# Patient Record
Sex: MALE | Race: OTHER | Employment: FULL TIME | URBAN - METROPOLITAN AREA
[De-identification: names, ages, dates, MRNs, and addresses within clinical notes are randomized per-mention and may not be internally consistent; named-entity substitution may affect disease eponyms.]

---

## 2017-04-07 ENCOUNTER — HOSPITAL ENCOUNTER (OUTPATIENT)
Dept: LAB | Age: 42
Discharge: HOME | End: 2017-04-07
Payer: COMMERCIAL

## 2017-04-07 ENCOUNTER — OFFICE VISIT (OUTPATIENT)
Dept: FAMILY MEDICINE CLINIC | Age: 42
End: 2017-04-07

## 2017-04-07 VITALS
DIASTOLIC BLOOD PRESSURE: 113 MMHG | HEIGHT: 63 IN | SYSTOLIC BLOOD PRESSURE: 167 MMHG | WEIGHT: 147 LBS | HEART RATE: 98 BPM | RESPIRATION RATE: 20 BRPM | BODY MASS INDEX: 26.05 KG/M2 | TEMPERATURE: 97.7 F | OXYGEN SATURATION: 100 %

## 2017-04-07 DIAGNOSIS — I10 ESSENTIAL HYPERTENSION: Primary | ICD-10-CM

## 2017-04-07 DIAGNOSIS — E78.5 OTHER AND UNSPECIFIED HYPERLIPIDEMIA: ICD-10-CM

## 2017-04-07 DIAGNOSIS — J45.20 MILD INTERMITTENT ASTHMA WITHOUT COMPLICATION: ICD-10-CM

## 2017-04-07 DIAGNOSIS — I10 ESSENTIAL HYPERTENSION: ICD-10-CM

## 2017-04-07 LAB
ALBUMIN SERPL BCP-MCNC: 4 G/DL (ref 3.4–5)
ALBUMIN/GLOB SERPL: 1.1 {RATIO} (ref 0.8–1.7)
ALP SERPL-CCNC: 58 U/L (ref 45–117)
ALT SERPL-CCNC: 40 U/L (ref 16–61)
ANION GAP BLD CALC-SCNC: 6 MMOL/L (ref 3–18)
AST SERPL W P-5'-P-CCNC: 30 U/L (ref 15–37)
BASOPHILS # BLD AUTO: 0.1 K/UL (ref 0–0.06)
BASOPHILS # BLD: 1 % (ref 0–2)
BILIRUB SERPL-MCNC: 0.5 MG/DL (ref 0.2–1)
BUN SERPL-MCNC: 10 MG/DL (ref 7–18)
BUN/CREAT SERPL: 12 (ref 12–20)
CALCIUM SERPL-MCNC: 9.2 MG/DL (ref 8.5–10.1)
CHLORIDE SERPL-SCNC: 103 MMOL/L (ref 100–108)
CHOLEST SERPL-MCNC: 222 MG/DL
CO2 SERPL-SCNC: 29 MMOL/L (ref 21–32)
CREAT SERPL-MCNC: 0.82 MG/DL (ref 0.6–1.3)
DIFFERENTIAL METHOD BLD: ABNORMAL
EOSINOPHIL # BLD: 0.2 K/UL (ref 0–0.4)
EOSINOPHIL NFR BLD: 3 % (ref 0–5)
ERYTHROCYTE [DISTWIDTH] IN BLOOD BY AUTOMATED COUNT: 12.2 % (ref 11.6–14.5)
GLOBULIN SER CALC-MCNC: 3.8 G/DL (ref 2–4)
GLUCOSE SERPL-MCNC: 99 MG/DL (ref 74–99)
HCT VFR BLD AUTO: 44 % (ref 36–48)
HDLC SERPL-MCNC: 72 MG/DL (ref 40–60)
HDLC SERPL: 3.1 {RATIO} (ref 0–5)
HGB BLD-MCNC: 14.5 G/DL (ref 13–16)
LDLC SERPL CALC-MCNC: 121.2 MG/DL (ref 0–100)
LIPID PROFILE,FLP: ABNORMAL
LYMPHOCYTES # BLD AUTO: 22 % (ref 21–52)
LYMPHOCYTES # BLD: 1.3 K/UL (ref 0.9–3.6)
MCH RBC QN AUTO: 30.3 PG (ref 24–34)
MCHC RBC AUTO-ENTMCNC: 33 G/DL (ref 31–37)
MCV RBC AUTO: 92.1 FL (ref 74–97)
MONOCYTES # BLD: 0.6 K/UL (ref 0.05–1.2)
MONOCYTES NFR BLD AUTO: 10 % (ref 3–10)
NEUTS SEG # BLD: 4 K/UL (ref 1.8–8)
NEUTS SEG NFR BLD AUTO: 64 % (ref 40–73)
PLATELET # BLD AUTO: 196 K/UL (ref 135–420)
PMV BLD AUTO: 11.3 FL (ref 9.2–11.8)
POTASSIUM SERPL-SCNC: 4.8 MMOL/L (ref 3.5–5.5)
PROT SERPL-MCNC: 7.8 G/DL (ref 6.4–8.2)
RBC # BLD AUTO: 4.78 M/UL (ref 4.7–5.5)
SODIUM SERPL-SCNC: 138 MMOL/L (ref 136–145)
TRIGL SERPL-MCNC: 144 MG/DL (ref ?–150)
VLDLC SERPL CALC-MCNC: 28.8 MG/DL
WBC # BLD AUTO: 6.1 K/UL (ref 4.6–13.2)

## 2017-04-07 PROCEDURE — 80061 LIPID PANEL: CPT | Performed by: INTERNAL MEDICINE

## 2017-04-07 PROCEDURE — 85025 COMPLETE CBC W/AUTO DIFF WBC: CPT | Performed by: INTERNAL MEDICINE

## 2017-04-07 PROCEDURE — 36415 COLL VENOUS BLD VENIPUNCTURE: CPT | Performed by: INTERNAL MEDICINE

## 2017-04-07 PROCEDURE — 80053 COMPREHEN METABOLIC PANEL: CPT | Performed by: INTERNAL MEDICINE

## 2017-04-07 RX ORDER — AMLODIPINE BESYLATE 5 MG/1
5 TABLET ORAL DAILY
Qty: 30 TAB | Refills: 1 | Status: SHIPPED | OUTPATIENT
Start: 2017-04-07 | End: 2017-04-28 | Stop reason: SDUPTHER

## 2017-04-07 RX ORDER — VALSARTAN 160 MG/1
160 TABLET ORAL DAILY
Qty: 30 TAB | Refills: 1 | Status: SHIPPED | OUTPATIENT
Start: 2017-04-07 | End: 2017-04-28 | Stop reason: SDUPTHER

## 2017-04-07 RX ORDER — ALBUTEROL SULFATE 90 UG/1
2 AEROSOL, METERED RESPIRATORY (INHALATION)
Qty: 1 INHALER | Refills: 3 | Status: SHIPPED | OUTPATIENT
Start: 2017-04-07 | End: 2018-05-10 | Stop reason: SDUPTHER

## 2017-04-07 NOTE — PROGRESS NOTES
HISTORY OF PRESENT ILLNESS  Betsy Garcia is a 39 y.o. male. HPI  HTN, not controlled, he is out of meds, last OV here was 2014!! HLD, not controlled, will repeat labs    Asthma, mild, intermittent, stable, use albuterol prn only  Review of Systems   Respiratory: Negative for cough and shortness of breath. Cardiovascular: Negative for chest pain and palpitations. Gastrointestinal: Negative for abdominal pain and nausea. Neurological: Negative for headaches. Physical Exam   Constitutional: He is oriented to person, place, and time. He appears well-developed and well-nourished. Cardiovascular: Normal rate, regular rhythm and normal heart sounds. Pulmonary/Chest: Effort normal and breath sounds normal. He has no wheezes. He has no rales. Abdominal: Soft. There is no tenderness. Musculoskeletal: He exhibits no edema. Neurological: He is alert and oriented to person, place, and time. Vitals reviewed. ASSESSMENT and PLAN  Jamey John was seen today for hypertension and medication refill. Diagnoses and all orders for this visit:    Essential hypertension, not controlled  -     amLODIPine (NORVASC) 5 mg tablet; Take 1 Tab by mouth daily. -     valsartan (DIOVAN) 160 mg tablet; Take 1 Tab by mouth daily.  -     CBC WITH AUTOMATED DIFF; Future  -     LIPID PANEL; Future  -     METABOLIC PANEL, COMPREHENSIVE; Future    Other and unspecified hyperlipidemia, wait for labs  -     CBC WITH AUTOMATED DIFF; Future  -     LIPID PANEL; Future  -     METABOLIC PANEL, COMPREHENSIVE; Future    Mild intermittent asthma without complication, stable  -     albuterol (PROVENTIL HFA, VENTOLIN HFA, PROAIR HFA) 90 mcg/actuation inhaler; Take 2 Puffs by inhalation every six (6) hours as needed for Wheezing.     Lab Results   Component Value Date/Time    Cholesterol, total 206 10/31/2014 09:39 AM    HDL Cholesterol 56 10/31/2014 09:39 AM    LDL, calculated 125 10/31/2014 09:39 AM    VLDL, calculated 25 10/31/2014 09:39 AM    Triglyceride 126 10/31/2014 09:39 AM    CHOL/HDL Ratio 2.1 03/23/2010 09:37 AM     rtc 3 weeks

## 2017-04-28 ENCOUNTER — OFFICE VISIT (OUTPATIENT)
Dept: FAMILY MEDICINE CLINIC | Age: 42
End: 2017-04-28

## 2017-04-28 VITALS
TEMPERATURE: 99.1 F | BODY MASS INDEX: 26.05 KG/M2 | SYSTOLIC BLOOD PRESSURE: 148 MMHG | DIASTOLIC BLOOD PRESSURE: 100 MMHG | WEIGHT: 147 LBS | HEIGHT: 63 IN | HEART RATE: 74 BPM | OXYGEN SATURATION: 98 %

## 2017-04-28 DIAGNOSIS — I10 ESSENTIAL HYPERTENSION: Primary | ICD-10-CM

## 2017-04-28 DIAGNOSIS — E78.5 OTHER AND UNSPECIFIED HYPERLIPIDEMIA: ICD-10-CM

## 2017-04-28 RX ORDER — VALSARTAN 320 MG/1
320 TABLET ORAL DAILY
Qty: 30 TAB | Refills: 2 | Status: SHIPPED | OUTPATIENT
Start: 2017-04-28 | End: 2017-08-01 | Stop reason: SDUPTHER

## 2017-04-28 RX ORDER — AMLODIPINE BESYLATE 10 MG/1
10 TABLET ORAL DAILY
Qty: 30 TAB | Refills: 2 | Status: SHIPPED | OUTPATIENT
Start: 2017-04-28 | End: 2017-08-01

## 2017-04-28 RX ORDER — ATORVASTATIN CALCIUM 10 MG/1
10 TABLET, FILM COATED ORAL DAILY
Qty: 30 TAB | Refills: 3 | Status: SHIPPED | OUTPATIENT
Start: 2017-04-28 | End: 2021-01-13 | Stop reason: SDUPTHER

## 2017-04-28 NOTE — PROGRESS NOTES
HISTORY OF PRESENT ILLNESS  Danish White is a 39 y.o. male. HPI  HTN, not controlled, bp is still elevated , he is taking his meds daily    HLD, not controlled, recent labs noted, LDL was 121, discussed treatment today  Review of Systems   Respiratory: Negative for cough and shortness of breath. Cardiovascular: Negative for chest pain, palpitations and leg swelling. Gastrointestinal: Negative for abdominal pain and nausea. Musculoskeletal: Negative for myalgias. Neurological: Negative for dizziness and headaches. Physical Exam   Constitutional: He is oriented to person, place, and time. He appears well-developed and well-nourished. Neck: Neck supple. No thyromegaly present. Cardiovascular: Normal rate, regular rhythm, normal heart sounds and intact distal pulses. No murmur heard. Pulmonary/Chest: Effort normal and breath sounds normal. No respiratory distress. He has no rales. Abdominal: Soft. Bowel sounds are normal. There is no hepatosplenomegaly. There is no tenderness. Musculoskeletal: He exhibits no edema. Neurological: He is alert and oriented to person, place, and time. Vitals reviewed. ASSESSMENT and PLAN  Shekhar Garber was seen today for hypertension. Diagnoses and all orders for this visit:    Essential hypertension, uncontrolled, increase meds doses  -     valsartan (DIOVAN) 320 mg tablet; Take 1 Tab by mouth daily. -     amLODIPine (NORVASC) 10 mg tablet; Take 1 Tab by mouth daily. Other and unspecified hyperlipidemia, not controlled, start:  -     atorvastatin (LIPITOR) 10 mg tablet; Take 1 Tab by mouth daily.     Lab Results   Component Value Date/Time    Cholesterol, total 222 04/07/2017 10:01 AM    HDL Cholesterol 72 04/07/2017 10:01 AM    LDL, calculated 121.2 04/07/2017 10:01 AM    VLDL, calculated 28.8 04/07/2017 10:01 AM    Triglyceride 144 04/07/2017 10:01 AM    CHOL/HDL Ratio 3.1 04/07/2017 10:01 AM     Lab Results   Component Value Date/Time    Sodium 138 04/07/2017 10:01 AM    Potassium 4.8 04/07/2017 10:01 AM    Chloride 103 04/07/2017 10:01 AM    CO2 29 04/07/2017 10:01 AM    Anion gap 6 04/07/2017 10:01 AM    Glucose 99 04/07/2017 10:01 AM    BUN 10 04/07/2017 10:01 AM    Creatinine 0.82 04/07/2017 10:01 AM    BUN/Creatinine ratio 12 04/07/2017 10:01 AM    GFR est AA >60 04/07/2017 10:01 AM    GFR est non-AA >60 04/07/2017 10:01 AM    Calcium 9.2 04/07/2017 10:01 AM    Bilirubin, total 0.5 04/07/2017 10:01 AM    AST (SGOT) 30 04/07/2017 10:01 AM    Alk.  phosphatase 58 04/07/2017 10:01 AM    Protein, total 7.8 04/07/2017 10:01 AM    Albumin 4.0 04/07/2017 10:01 AM    Globulin 3.8 04/07/2017 10:01 AM    A-G Ratio 1.1 04/07/2017 10:01 AM    ALT (SGPT) 40 04/07/2017 10:01 AM       rtc 2 mos

## 2017-04-28 NOTE — PROGRESS NOTES
1. Have you been to the ER, urgent care clinic since your last visit? Hospitalized since your last visit? No    2. Have you seen or consulted any other health care providers outside of the [address] [place] [address] since your last visit? Include any pap smears or colon screening.  No

## 2017-07-07 ENCOUNTER — TELEPHONE (OUTPATIENT)
Dept: FAMILY MEDICINE CLINIC | Age: 42
End: 2017-07-07

## 2017-07-07 NOTE — TELEPHONE ENCOUNTER
Pt's wife called because hr  is wanting to stop drinking but he will be going through withdrawal when he does that. They are looking for guidance on how to go about that please advise.

## 2017-07-10 ENCOUNTER — OFFICE VISIT (OUTPATIENT)
Dept: FAMILY MEDICINE CLINIC | Age: 42
End: 2017-07-10

## 2017-07-10 VITALS
BODY MASS INDEX: 25.34 KG/M2 | SYSTOLIC BLOOD PRESSURE: 162 MMHG | RESPIRATION RATE: 20 BRPM | TEMPERATURE: 98.7 F | HEART RATE: 96 BPM | OXYGEN SATURATION: 98 % | WEIGHT: 143 LBS | HEIGHT: 63 IN | DIASTOLIC BLOOD PRESSURE: 96 MMHG

## 2017-07-10 DIAGNOSIS — I10 ESSENTIAL HYPERTENSION: Primary | ICD-10-CM

## 2017-07-10 DIAGNOSIS — F10.10 ETOH ABUSE: ICD-10-CM

## 2017-07-10 RX ORDER — LORAZEPAM 1 MG/1
1 TABLET ORAL
Qty: 60 TAB | Refills: 1 | Status: SHIPPED | OUTPATIENT
Start: 2017-07-10 | End: 2018-11-07

## 2017-07-10 RX ORDER — METOPROLOL SUCCINATE 25 MG/1
25 TABLET, EXTENDED RELEASE ORAL DAILY
Qty: 30 TAB | Refills: 2 | Status: SHIPPED | OUTPATIENT
Start: 2017-07-10 | End: 2017-08-01 | Stop reason: SDUPTHER

## 2017-07-10 NOTE — PROGRESS NOTES
HISTORY OF PRESENT ILLNESS  Kian Ramon is a 43 y.o. male. HPI  HTN, not controlled, his bp is elevated, he stopped norvasc due to legs edema  ETOH abuse, he stated that he has been drinking about 6-7 beers daily, he is trying to slow down but having tremors, wants to stop drinking completely, discussed with him and his wife today, will start ativan and ref to psych, he did abuse alcohol in the past then quit, but recently relapsed  Review of Systems   Constitutional: Negative for fever. Respiratory: Negative for cough and shortness of breath. Cardiovascular: Negative for chest pain and palpitations. Gastrointestinal: Negative for abdominal pain and nausea. Musculoskeletal: Negative for myalgias. Neurological: Negative for dizziness and headaches. Psychiatric/Behavioral: The patient is nervous/anxious. The patient does not have insomnia. Physical Exam   Constitutional: He is oriented to person, place, and time. He appears well-developed and well-nourished. Cardiovascular: Normal rate, regular rhythm, normal heart sounds and intact distal pulses. No murmur heard. Pulmonary/Chest: Effort normal and breath sounds normal. He has no rales. Abdominal: Soft. Bowel sounds are normal. There is no hepatosplenomegaly. There is no tenderness. Musculoskeletal: He exhibits no edema. Neurological: He is alert and oriented to person, place, and time. Psychiatric: His speech is normal and behavior is normal. Thought content normal. His mood appears anxious. Vitals reviewed. ASSESSMENT and PLAN  Heidi Garcia was seen today for alcohol problem and medication evaluation. Diagnoses and all orders for this visit:    Essential hypertension, uncontrolled  -     metoprolol succinate (TOPROL-XL) 25 mg XL tablet; Take 1 Tab by mouth daily. ETOH abuse, start:  -     LORazepam (ATIVAN) 1 mg tablet; Take 1 Tab by mouth every eight (8) hours as needed for Anxiety.  Max Daily Amount: 3 mg.  - REFERRAL TO PSYCHIATRY  Stop ETOH  Advised to go to the ER if tremors are not controlled by ativan    rtc 2 weeks

## 2017-07-10 NOTE — PROGRESS NOTES
1. Have you been to the ER, urgent care clinic since your last visit? Hospitalized since your last visit? No    2. Have you seen or consulted any other health care providers outside of the [address] [place] [address] since your last visit? Include any pap smears or colon screening.  No

## 2017-08-01 ENCOUNTER — OFFICE VISIT (OUTPATIENT)
Dept: FAMILY MEDICINE CLINIC | Age: 42
End: 2017-08-01

## 2017-08-01 VITALS
BODY MASS INDEX: 25.69 KG/M2 | SYSTOLIC BLOOD PRESSURE: 160 MMHG | HEIGHT: 63 IN | RESPIRATION RATE: 22 BRPM | OXYGEN SATURATION: 99 % | DIASTOLIC BLOOD PRESSURE: 100 MMHG | TEMPERATURE: 98.9 F | HEART RATE: 81 BPM | WEIGHT: 145 LBS

## 2017-08-01 DIAGNOSIS — F10.10 ETOH ABUSE: ICD-10-CM

## 2017-08-01 DIAGNOSIS — I10 ESSENTIAL HYPERTENSION: ICD-10-CM

## 2017-08-01 RX ORDER — VALSARTAN 320 MG/1
320 TABLET ORAL DAILY
Qty: 30 TAB | Refills: 2 | Status: SHIPPED | OUTPATIENT
Start: 2017-08-01 | End: 2017-12-15 | Stop reason: SDUPTHER

## 2017-08-01 RX ORDER — METOPROLOL SUCCINATE 50 MG/1
50 TABLET, EXTENDED RELEASE ORAL DAILY
Qty: 30 TAB | Refills: 1 | Status: SHIPPED | OUTPATIENT
Start: 2017-08-01 | End: 2017-10-01 | Stop reason: SDUPTHER

## 2017-08-01 RX ORDER — AMLODIPINE BESYLATE 2.5 MG/1
2.5 TABLET ORAL DAILY
Qty: 30 TAB | Refills: 1 | Status: SHIPPED | OUTPATIENT
Start: 2017-08-01 | End: 2017-09-05 | Stop reason: SDUPTHER

## 2017-08-01 NOTE — PROGRESS NOTES
HISTORY OF PRESENT ILLNESS  Rebecca Herrera is a 43 y.o. male. HPI  HTN, improving but not controlled yet, he is taking his meds daily  ETOH abuse, improving, he stated he is drinking 1-2 berrs per day now, using ativan only half tab in am, denies any tremors or insomnia    Review of Systems   HENT: Negative for hearing loss. Cardiovascular: Negative for chest pain and palpitations. Neurological: Negative for dizziness and headaches. Physical Exam   Constitutional: He is oriented to person, place, and time. He appears well-developed and well-nourished. Cardiovascular: Normal rate, regular rhythm and normal heart sounds. No murmur heard. Pulmonary/Chest: Effort normal and breath sounds normal. He has no rales. Abdominal: Soft. Bowel sounds are normal. There is no tenderness. Neurological: He is alert and oriented to person, place, and time. Psychiatric: He has a normal mood and affect. His behavior is normal. Judgment and thought content normal.   Vitals reviewed. ASSESSMENT and PLAN  Diagnoses and all orders for this visit:    1. Essential hypertension , not controlled  -     metoprolol succinate (TOPROL-XL) 50 mg XL tablet; Take 1 Tab by mouth daily. -     amLODIPine (NORVASC) 2.5 mg tablet; Take 1 Tab by mouth daily. -     valsartan (DIOVAN) 320 mg tablet; Take 1 Tab by mouth daily.     2- ETOH abuse, improving, advised to use ativan 2-3 x daily and stop ETOH completely    rtc 4 weeks, sooner if needed

## 2017-08-01 NOTE — MR AVS SNAPSHOT
Visit Information Date & Time Provider Department Dept. Phone Encounter #  
 8/1/2017  3:30 PM [facility] [phone] [id number] Upcoming Health Maintenance Date Due Pneumococcal 19-64 Medium Risk (1 of 1 - PPSV23) 7/7/1994 INFLUENZA AGE 9 TO ADULT 8/1/2017 DTaP/Tdap/Td series (2 - Td) 10/10/2022 Allergies as of 8/1/2017  Review Complete On: 7/10/2017 By: Barrington Pierce MD  
  
 Severity Noted Reaction Type Reactions Ace Inhibitors High 10/08/2010   Side Effect Cough Theophylline  03/22/2010    Unknown (comments) Current Immunizations  Never Reviewed Name Date TDAP Vaccine 10/10/2012 Not reviewed this visit You Were Diagnosed With   
  
 Codes Comments Essential hypertension     ICD-10-CM: I10 
ICD-9-CM: 401.9 Vitals BP Pulse Temp Resp Height(growth percentile) Weight(growth percentile) (!) 160/100 (BP 1 Location: Right arm, BP Patient Position: Sitting) 81 98.9 °F (37.2 °C) (Oral) 22 5' 3\" (1.6 m) 145 lb (65.8 kg) SpO2 BMI Smoking Status 99% 25.69 kg/m2 Former Smoker Vitals History BMI and BSA Data Body Mass Index Body Surface Area  
 25.69 kg/m 2 1.71 m 2 Preferred Pharmacy Pharmacy Name Phone [facility] [phone] Your Updated Medication List  
  
   
This list is accurate as of: 8/1/17  4:02 PM.  Always use your most recent med list.  
  
  
  
  
 albuterol 90 mcg/actuation inhaler Commonly known as:  PROVENTIL HFA, VENTOLIN HFA, PROAIR HFA Take 2 Puffs by inhalation every six (6) hours as needed for Wheezing. amLODIPine 2.5 mg tablet Commonly known as:  Cosme Matar Take 1 Tab by mouth daily. atorvastatin 10 mg tablet Commonly known as:  LIPITOR Take 1 Tab by mouth daily. LORazepam 1 mg tablet Commonly known as:  ATIVAN  
 Take 1 Tab by mouth every eight (8) hours as needed for Anxiety. Max Daily Amount: 3 mg.  
  
 metoprolol succinate 50 mg XL tablet Commonly known as:  TOPROL-XL Take 1 Tab by mouth daily. omeprazole 40 mg capsule Commonly known as:  PRILOSEC Take 1 Cap by mouth daily. valsartan 320 mg tablet Commonly known as:  DIOVAN Take 1 Tab by mouth daily. Prescriptions Sent to Pharmacy Refills  
 metoprolol succinate (TOPROL-XL) 50 mg XL tablet 1 Sig: Take 1 Tab by mouth daily. Class: Normal  
 Pharmacy: [address] [place] [address] #: [phone] Route: Oral  
 amLODIPine (NORVASC) 2.5 mg tablet 1 Sig: Take 1 Tab by mouth daily. Class: Normal  
 Pharmacy: [address] [place] [address] #: [phone] Route: Oral  
  
Introducing [facility] SERVICES! Ely Maravilla introduces Ping4 patient portal. Now you can access parts of your medical record, email your doctor's office, and request medication refills online. 1. In your internet browser, go to [email or web address] 2. Click on the First Time User? Click Here link in the Sign In box. You will see the New Member Sign Up page. 3. Enter your Ping4 Access Code exactly as it appears below. You will not need to use this code after youve completed the sign-up process. If you do not sign up before the expiration date, you must request a new code. · [employer] Access Code: [facility] Expires: 10/30/2017  4:02 PM 
 
4. Enter the last four digits of your Social Security Number (xxxx) and Date of Birth (mm/dd/yyyy) as indicated and click Submit. You will be taken to the next sign-up page. 5. Create a [employer] ID. This will be your Ping4 login ID and cannot be changed, so think of one that is secure and easy to remember. 6. Create a [employer] password. You can change your password at any time. 7. Enter your Password Reset Question and Answer. This can be used at a later time if you forget your password. 8. Enter your e-mail address. You will receive e-mail notification when new information is available in [address] 9. Click Sign Up. You can now view and download portions of your medical record. 10. Click the Download Summary menu link to download a portable copy of your medical information. If you have questions, please visit the Frequently Asked Questions section of the [employer] website. Remember, [employer] is NOT to be used for urgent needs. For medical emergencies, dial 911. Now available from your iPhone and Android! Please provide this summary of care documentation to your next provider. Your primary care clinician is listed as Alexandre Mcdowell. If you have any questions after today's visit, please call [phone].

## 2017-08-01 NOTE — PROGRESS NOTES
Rosanne Marte is a 43 y.o. male  1. Have you been to the ER, urgent care clinic since your last visit? Hospitalized since your last visit? No    2. Have you seen or consulted any other health care providers outside of the [facility] since your last visit? Include any pap smears or colon screening.  No

## 2017-09-05 ENCOUNTER — OFFICE VISIT (OUTPATIENT)
Dept: FAMILY MEDICINE CLINIC | Age: 42
End: 2017-09-05

## 2017-09-05 VITALS
HEIGHT: 63 IN | SYSTOLIC BLOOD PRESSURE: 162 MMHG | OXYGEN SATURATION: 99 % | BODY MASS INDEX: 25.16 KG/M2 | DIASTOLIC BLOOD PRESSURE: 100 MMHG | TEMPERATURE: 99.1 F | HEART RATE: 97 BPM | WEIGHT: 142 LBS | RESPIRATION RATE: 20 BRPM

## 2017-09-05 DIAGNOSIS — I10 ESSENTIAL HYPERTENSION: ICD-10-CM

## 2017-09-05 DIAGNOSIS — Z78.9 ALCOHOL USE: ICD-10-CM

## 2017-09-05 DIAGNOSIS — I10 ESSENTIAL HYPERTENSION: Primary | ICD-10-CM

## 2017-09-05 RX ORDER — AMLODIPINE BESYLATE 5 MG/1
5 TABLET ORAL DAILY
Qty: 30 TAB | Refills: 3 | Status: SHIPPED | OUTPATIENT
Start: 2017-09-05 | End: 2017-11-07 | Stop reason: SDUPTHER

## 2017-09-05 NOTE — PROGRESS NOTES
HISTORY OF PRESENT ILLNESS  Gaye Mattson is a 43 y.o. male. HPI  HTN, still not controlled, he stated he is taking diovan and norvasc daily, but did not take toprol xl for 4 days! ETOH abuse, he stated he still use 2 drinks per day at least, not able to stop   Review of Systems   Respiratory: Negative for shortness of breath. Cardiovascular: Negative for chest pain, palpitations and leg swelling. Gastrointestinal: Negative for abdominal pain and nausea. Physical Exam   Constitutional: He is oriented to person, place, and time. He appears well-developed and well-nourished. Cardiovascular: Normal rate, regular rhythm and normal heart sounds. No murmur heard. Pulmonary/Chest: Effort normal and breath sounds normal.   Abdominal: Soft. There is no tenderness. Musculoskeletal: He exhibits no edema. Neurological: He is alert and oriented to person, place, and time. Vitals reviewed. ASSESSMENT and PLAN  Diagnoses and all orders for this visit:    1. Essential hypertension, not controlled, advised not to stop any meds, he will go to the pharmacy and get his Toprol xl, increase norvasc to 5 mg, ref to Cardiology  -     DUPLEX RENAL ART/CHERELLE BILATERAL; Future  -     CBC WITH AUTOMATED DIFF; Future  -     METABOLIC PANEL, COMPREHENSIVE; Future  -     amLODIPine (NORVASC) 5 mg tablet; Take 1 Tab by mouth daily.  -     REFERRAL TO CARDIOLOGY  -     TSH 3RD GENERATION; Future  -     URINALYSIS W/ RFLX MICROSCOPIC; Future    2.  Alcohol use, not controlled, discussed the need to completely stop ETOH, will:  -     REFERRAL TO PSYCHIATRY

## 2017-09-05 NOTE — PROGRESS NOTES
Ying Meehan is a 43 y.o. male  1. Have you been to the ER, urgent care clinic since your last visit? Hospitalized since your last visit? No    2. Have you seen or consulted any other health care providers outside of the [address] [place] [address] since your last visit? Include any pap smears or colon screening.  No

## 2017-09-08 ENCOUNTER — TELEPHONE (OUTPATIENT)
Dept: CARDIOLOGY CLINIC | Age: 42
End: 2017-09-08

## 2017-09-08 NOTE — TELEPHONE ENCOUNTER
Left message w/ pt's person to sched NP appt per PCP referral.  Pt works at [employer] and will call us to sched.

## 2017-09-19 ENCOUNTER — HOSPITAL ENCOUNTER (OUTPATIENT)
Dept: VASCULAR SURGERY | Age: 42
Discharge: HOME | End: 2017-09-19
Attending: INTERNAL MEDICINE
Payer: COMMERCIAL

## 2017-09-19 DIAGNOSIS — I10 ESSENTIAL HYPERTENSION: ICD-10-CM

## 2017-09-19 PROCEDURE — 93975 VASCULAR STUDY: CPT

## 2017-09-19 NOTE — PROCEDURES
[facility]  *** FINAL REPORT ***    Name: Lazaro Hernandez  MRN: [id number]    Outpatient  : 1975  HIS Order #: [id number]  [id number] [facility] Visit #: [id number]  Date: 19 Sep 2017    TYPE OF TEST: Visceral Arterial Duplex    REASON FOR TEST  Benign HTN    Aortic PSV: 100.0 cm/s  Diameter AP:     cm   TV:     cm                   Right          Left  Renal Artery:- -------------  -------------  Proximal  PSV: 119.0          108.0  Mid       PSV: 110.0          116.0  Distal    PSV:  97.0           81.0  Aortic ratio :   1.2            1.2    Medullary PSV:  39.4           36.2            EDV:  16.3           14.5            EDR:   0.4            0.4            SDR:   2.4            2.5    Cortical  PSV:  24.3           34.2            EDV:  10.4           14.3            EDR:   0.4            0.4            SDR:   2.3            2.4  Stenosis:      Normal         Normal  Kidney size:   11.3 cm        11.3 cm               x  5.2 cm      x  5.1 cm    Hilar:-        Right          Left  Acc. Time  AT:     secs           secs  Acc. Index AI:             RI: 0.59           0.59    INTERPRETATION/FINDINGS  Duplex images were obtained using 2-D gray scale, color flow and  spectral doppler analysis. 1. Renal arteries are patent with color and spectral doppler  bilaterally without evidence of significant stenosis. 2. Patent renal artery origins without stenosis. 3. Systolic rise time and resistive indices are within normal limits  bilaterally. 4. Patent renal veins with normal hemodynamics. 5. Right kidney measures 11.3 x 5.2.   6. Left kidney measures 11.3 x 5.1. ADDITIONAL COMMENTS    I have personally reviewed the data relevant to the interpretation of  this  study. TECHNOLOGIST: Abhishek Glez. Reeta Kawasaki  Signed: 2017 10:41 AM    PHYSICIAN: Pia Emanuel.  Pepe Tomas MD  Signed: 2017 05:14 PM

## 2017-09-20 NOTE — PROGRESS NOTES
Normal renal arteries    Please remind pt to make appt with Cardiology, looks like they called him but no appt yet!!     Also please make sure he is seen by Psych

## 2017-09-20 NOTE — PROGRESS NOTES
Attempted to contact pt but no answer. Unable to leave message because no available VM/VM not set up yet.  Thank you  Roz Clarke LPN

## 2017-10-01 DIAGNOSIS — I10 ESSENTIAL HYPERTENSION: ICD-10-CM

## 2017-10-02 RX ORDER — METOPROLOL SUCCINATE 50 MG/1
TABLET, EXTENDED RELEASE ORAL
Qty: 30 TAB | Refills: 1 | Status: SHIPPED | OUTPATIENT
Start: 2017-10-02 | End: 2018-01-27 | Stop reason: SDUPTHER

## 2017-11-07 DIAGNOSIS — I10 ESSENTIAL HYPERTENSION: ICD-10-CM

## 2017-11-07 RX ORDER — AMLODIPINE BESYLATE 5 MG/1
5 TABLET ORAL DAILY
Qty: 90 TAB | Refills: 0 | Status: SHIPPED | OUTPATIENT
Start: 2017-11-07 | End: 2018-03-07 | Stop reason: SDUPTHER

## 2017-11-08 NOTE — TELEPHONE ENCOUNTER
Called and l/m to family member to have patient call office. If patient called back please inform/notify him of Dr. Samantha Masters.

## 2018-03-13 DIAGNOSIS — I10 ESSENTIAL HYPERTENSION: ICD-10-CM

## 2018-03-16 NOTE — TELEPHONE ENCOUNTER
Pt requesting RX refill(s) for:  Requested Prescriptions     Pending Prescriptions Disp Refills    valsartan (DIOVAN) 320 mg tablet [Pharmacy Med Name: VALSARTAN 320 MG TABLET] 30 Tab 0     Sig: TAKE 1 TABLET BY MOUTH DAILY.        Last refill: 12/16/17    Last visit: 09/05/17

## 2018-03-18 RX ORDER — VALSARTAN 320 MG/1
TABLET ORAL
Qty: 30 TAB | Refills: 0 | Status: SHIPPED | OUTPATIENT
Start: 2018-03-18 | End: 2018-04-19 | Stop reason: SDUPTHER

## 2018-04-05 DIAGNOSIS — I10 ESSENTIAL HYPERTENSION: ICD-10-CM

## 2018-04-05 RX ORDER — AMLODIPINE BESYLATE 5 MG/1
TABLET ORAL
Qty: 30 TAB | Refills: 0 | Status: SHIPPED | OUTPATIENT
Start: 2018-04-05 | End: 2018-05-11 | Stop reason: SDUPTHER

## 2018-04-19 DIAGNOSIS — I10 ESSENTIAL HYPERTENSION: ICD-10-CM

## 2018-04-19 RX ORDER — VALSARTAN 320 MG/1
TABLET ORAL
Qty: 30 TAB | Refills: 0 | Status: SHIPPED | OUTPATIENT
Start: 2018-04-19 | End: 2018-05-22 | Stop reason: SDUPTHER

## 2018-05-10 DIAGNOSIS — I10 ESSENTIAL HYPERTENSION: ICD-10-CM

## 2018-05-10 RX ORDER — AMLODIPINE BESYLATE 5 MG/1
TABLET ORAL
Qty: 30 TAB | Refills: 0 | OUTPATIENT
Start: 2018-05-10

## 2018-05-11 RX ORDER — AMLODIPINE BESYLATE 5 MG/1
TABLET ORAL
Qty: 30 TAB | Refills: 0 | Status: SHIPPED | OUTPATIENT
Start: 2018-05-11 | End: 2018-05-22 | Stop reason: SDUPTHER

## 2018-05-11 NOTE — TELEPHONE ENCOUNTER
Pt requesting refill for both prescriptions of amLODIPine 5 mg and albuterol 90 mcg. Pt has a future appt on 5/22/18 and is completely out of medication. Please advise.     Future Appointments  Date Time Provider Prashant Julia   5/22/2018 2:45 PM Andrew Zhang MD [facility]

## 2018-05-22 ENCOUNTER — OFFICE VISIT (OUTPATIENT)
Dept: CARDIOLOGY CLINIC | Age: 43
End: 2018-05-22

## 2018-05-22 VITALS
DIASTOLIC BLOOD PRESSURE: 110 MMHG | HEART RATE: 101 BPM | SYSTOLIC BLOOD PRESSURE: 177 MMHG | BODY MASS INDEX: 25.52 KG/M2 | HEIGHT: 63 IN | OXYGEN SATURATION: 97 % | WEIGHT: 144 LBS

## 2018-05-22 DIAGNOSIS — I11.9 HYPERTENSIVE HEART DISEASE, UNSPECIFIED WHETHER HEART FAILURE PRESENT: Primary | ICD-10-CM

## 2018-05-22 DIAGNOSIS — K21.9 GASTROESOPHAGEAL REFLUX DISEASE WITHOUT ESOPHAGITIS: ICD-10-CM

## 2018-05-22 DIAGNOSIS — I10 ESSENTIAL HYPERTENSION: ICD-10-CM

## 2018-05-22 RX ORDER — METOPROLOL SUCCINATE 50 MG/1
TABLET, EXTENDED RELEASE ORAL
Qty: 90 TAB | Refills: 3 | Status: SHIPPED | OUTPATIENT
Start: 2018-05-22 | End: 2019-06-14 | Stop reason: SDUPTHER

## 2018-05-22 RX ORDER — OMEPRAZOLE 40 MG/1
40 CAPSULE, DELAYED RELEASE ORAL DAILY
Qty: 90 CAP | Refills: 3 | Status: SHIPPED | OUTPATIENT
Start: 2018-05-22 | End: 2018-11-07

## 2018-05-22 RX ORDER — AMLODIPINE BESYLATE 5 MG/1
TABLET ORAL
Qty: 90 TAB | Refills: 3 | Status: SHIPPED | OUTPATIENT
Start: 2018-05-22 | End: 2018-09-10 | Stop reason: SDUPTHER

## 2018-05-22 RX ORDER — VALSARTAN 320 MG/1
TABLET ORAL
Qty: 90 TAB | Refills: 3 | Status: SHIPPED | OUTPATIENT
Start: 2018-05-22 | End: 2018-08-23

## 2018-05-22 NOTE — PROGRESS NOTES
Cardiovascular Specialists    Mr. Yamileth Franklin is a 43year old male with a history of hypertension, alcohol dependence, anxiety disorder. Mr. Yamielth Franklin was asked to come see me for the evaluation and management of uncontrolled high blood pressure. Mr. Yamileth Franklin was started on three different antihypertensive medications by PCP, however blood pressure has been difficult to control so he was asked to come see me. Mr. Yamileth Franklin denies any prior history of myocardial infarction or congestive heart failure. He has a history of alcohol dependence, which he's trying to improve on his alcohol intake and reduce it. He said he's on three different antihypertensives, however he admits he's not taking all his medication regularly. Out of a one week period he probably forgets to take his medication at least 2-3 days a week. He has run out of his Losartan at least for the last three days. He admits he's not taking his medication as is prescribed and as he should. He does not have any symptoms to suggest angina, heart failure, headache, chest pain, chest tightness, presyncope or syncope. He denies lower extremity edema. He is active and performs ADLs without any symptoms. Denies any nausea, vomiting, abdominal pain, fever, chills, sputum production. No hematuria or other bleeding complaints    Past Medical History:   Diagnosis Date    Asthma     Depression     EtOH dependence ([address]     Hypercholesterolemia     Hypertension     Pancreatitis, acute 4-2014         Past Surgical History:   Procedure Laterality Date    HX HERNIA REPAIR         Current Outpatient Prescriptions   Medication Sig    PROAIR HFA 90 mcg/actuation inhaler TAKE 2 PUFFS BY INHALATION EVERY SIX (6) HOURS AS NEEDED FOR WHEEZING.  amLODIPine (NORVASC) 5 mg tablet TAKE 1 TAB BY MOUTH DAILY.  valsartan (DIOVAN) 320 mg tablet TAKE 1 TABLET BY MOUTH DAILY.     metoprolol succinate (TOPROL-XL) 50 mg XL tablet TAKE 1 TABLET BY MOUTH DAILY.  LORazepam (ATIVAN) 1 mg tablet Take 1 Tab by mouth every eight (8) hours as needed for Anxiety. Max Daily Amount: 3 mg.  omeprazole (PRILOSEC) 40 mg capsule Take 1 Cap by mouth daily.  atorvastatin (LIPITOR) 10 mg tablet Take 1 Tab by mouth daily. No current facility-administered medications for this visit. Allergies and Sensitivities:  Allergies   Allergen Reactions    Ace Inhibitors Cough    Theophylline Unknown (comments)       Family History:  Family History   Problem Relation Age of Onset    Hypertension Mother     Hypertension Father     Hypertension Maternal Grandmother     Hypertension Maternal Grandfather     Diabetes Maternal Grandfather     High Cholesterol Maternal Grandfather        Social History:  Social History   Substance Use Topics    Smoking status: Former Smoker     Years: 1.00    Smokeless tobacco: Former User    Alcohol use No     He  reports that he has quit smoking. He quit after 1.00 year of use. He has quit using smokeless tobacco.  He  reports that he does not drink alcohol. Review of Systems:  Cardiac symptoms as noted above in HPI. All others negative. Denies fatigue, malaise, skin rash, joint pain, blurring vision, photophobia, neck pain, hemoptysis, chronic cough, nausea, vomiting, hematuria, burning micturition, BRBPR, chronic headaches. Physical Exam:  BP Readings from Last 3 Encounters:   05/22/18 (!) 177/110   09/05/17 (!) 162/100   08/01/17 (!) 160/100         Pulse Readings from Last 3 Encounters:   05/22/18 (!) 101   09/05/17 97   08/01/17 81          Wt Readings from Last 3 Encounters:   05/22/18 144 lb (65.3 kg)   09/05/17 142 lb (64.4 kg)   08/01/17 145 lb (65.8 kg)       Constitutional: Oriented to person, place, and time. HENT: Head: Normocephalic and atraumatic. Eyes: Conjunctivae and extraocular motions are normal.   Neck: No JVD present. Carotid bruit is not appreciated. Cardiovascular: Regular rhythm. No murmur, gallop or rubs appreciated  Lung: Breath sounds normal. No respiratory distress. No ronchi or rales appreciated  Abdominal: No tenderness. No rebound and no guarding. Musculoskeletal: There is no lower extremity edema. No cynosis  Lymphadenopathy:  No cervical or supraclavicular adenopathy appriciated. Neurological: No gross motor deficit noted. Skin: No visible skin rash noted. No Ear discharge noted  Psychiatric: Normal mood and affect. Good distal pulse    Review of Data  LABS:   Lab Results   Component Value Date/Time    Sodium 138 04/07/2017 10:01 AM    Potassium 4.8 04/07/2017 10:01 AM    Chloride 103 04/07/2017 10:01 AM    CO2 29 04/07/2017 10:01 AM    Glucose 99 04/07/2017 10:01 AM    BUN 10 04/07/2017 10:01 AM    Creatinine 0.82 04/07/2017 10:01 AM     Lipids Latest Ref Rng & Units 4/7/2017 10/31/2014   Chol, Total <200 MG/(H) 206(H)   HDL 40 - 60 MG/DL 72(H) 56   LDL 0 - 100 MG/. 2(H) 125(H)   Trig <150 MG/ 126   Chol/HDL Ratio 0 - 5.0   3.1 -   Some recent data might be hidden     Lab Results   Component Value Date/Time    ALT (SGPT) 40 04/07/2017 10:01 AM     Lab Results   Component Value Date/Time    Hemoglobin A1c 5.6 10/31/2014 09:39 AM       EKG (05/18) Sinus rhythm. Normal NC interval. NO ST changes of ischemia    ECHO    IMPRESSION & PLAN:  Mr. Calli Alcantara is a 43year old male with a history of hypertension, alcohol dependence and anxiety disorder. Hypertension:  Mr. Calli Alcantara has a longstanding history of hypertension. Currently he has been prescribed on three different antihypertensives, which include Amlodipine, Diovan and Toprol. His Amlodipine dose has recently been increased. Upon further questioning, Mr. Calli Alcantara admits he does not take his medication as prescribed regularly. He would not be taking his medication at least three days out of a seven day period.   It is the most likely reason why it is difficult to control. We had a lengthy discussion today regarding how important it is for him to take his medications regularly. Uncontrolled hypertension can cause heart problems and also stroke and kidney problems. He verbalizes to understand that. He admits that he's going to take his medications regularly. We will refill his Diovan, which he's run out of for the last three days. Echocardiogram will be ordered to rule out hypertensive cardiovascular heart disease, especially in the setting of alcohol dependence, to make sure he does not have any LV dysfunction. He was advised to take his medication regularly. Salt restriction was advised. He did have a renal doppler done in 2017, which did not show any renal artery stenosis. Most of his problem relates to his high blood pressure and difficult to control because of noncompliance with the medication, which he understands. Alcohol dependence:  Mr. Yossi Mosquera is trying to quit. He is undergoing assistance program.  He drinks probably two drinks a day now. An echocardiogram will be ordered to rule out alcohol related cardiomyopathy. Anxiety disorder:  He is on Ativan as needed, which has been prescribed by Dr. Columba Simeon. I would defer this management to PCP. Importance of diet and exercise was discussed with patient. This plan was discussed with patient who is in agreement. Thank you for allowing me to participate in patient care. Please feel free to call me if you have any question or concern. Francis Hill MD  Please note: This document has been produced using voice recognition software. Unrecognized errors in transcription may be present.

## 2018-05-22 NOTE — MR AVS SNAPSHOT
[address] [facility] [address] [address] 
[phone] Patient: Eddie Al MRN: [id number] FRA:[birth year] Visit Information Date & Time Provider Department Dept. Phone Encounter #  
 5/22/2018 11:00 AM Magnus Valentino MD [facility] [phone] [id number] Follow-up Instructions Return in about 4 weeks (around 6/19/2018). Your Appointments 6/15/2018  1:00 PM  
Follow Up with Alex Turner MD  
Cardio Specialist at [facility] Appt Note: after echo  
 [facility] [address] [address] [address] Upcoming Health Maintenance Date Due Pneumococcal 19-64 Medium Risk (1 of 1 - PPSV23) 7/7/1994 Influenza Age 5 to Adult 8/1/2018 DTaP/Tdap/Td series (2 - Td) 10/10/2022 Allergies as of 5/22/2018  Review Complete On: 5/22/2018 By: Gabriel Winkler RN Severity Noted Reaction Type Reactions Ace Inhibitors High 10/08/2010   Side Effect Cough Theophylline  03/22/2010    Unknown (comments) Current Immunizations  Never Reviewed Name Date TDAP Vaccine 10/10/2012 Not reviewed this visit You Were Diagnosed With   
  
 Codes Comments Essential hypertension    -  Primary ICD-10-CM: I10 
ICD-9-CM: 401.9 Vitals BP Pulse Height(growth percentile) Weight(growth percentile) SpO2 BMI  
 (!) 177/110 (!) 101 5' 3\" (1.6 m) 144 lb (65.3 kg) 97% 25.51 kg/m2 Smoking Status Former Smoker Vitals History BMI and BSA Data Body Mass Index Body Surface Area 25.51 kg/m 2 1.7 m 2 Preferred Pharmacy Pharmacy Name Phone [facility] [phone] Your Updated Medication List  
  
   
 This list is accurate as of 5/22/18 11:21 AM.  Always use your most recent med list. amLODIPine 5 mg tablet Commonly known as:  Zoë Chimes TAKE 1 TAB BY MOUTH DAILY. atorvastatin 10 mg tablet Commonly known as:  LIPITOR Take 1 Tab by mouth daily. LORazepam 1 mg tablet Commonly known as:  ATIVAN Take 1 Tab by mouth every eight (8) hours as needed for Anxiety. Max Daily Amount: 3 mg.  
  
 metoprolol succinate 50 mg XL tablet Commonly known as:  TOPROL-XL  
TAKE 1 TABLET BY MOUTH DAILY. omeprazole 40 mg capsule Commonly known as:  PRILOSEC Take 1 Cap by mouth daily. PROAIR HFA 90 mcg/actuation inhaler Generic drug:  albuterol TAKE 2 PUFFS BY INHALATION EVERY SIX (6) HOURS AS NEEDED FOR WHEEZING. valsartan 320 mg tablet Commonly known as:  DIOVAN  
TAKE 1 TABLET BY MOUTH DAILY. We Performed the Following AMB POC EKG ROUTINE W/ 12 LEADS, INTER & REP [63445 CPT(R)] Follow-up Instructions Return in about 4 weeks (around 6/19/2018). Patient Instructions Clinton Glez will call to schedule echo Introducing [facility] & HEALTH SERVICES! [employer] introduces [employer] patient portal. Now you can access parts of your medical record, email your doctor's office, and request medication refills online. 1. In your internet browser, go to [email or web address] 2. Click on the First Time User? Click Here link in the Sign In box. You will see the New Member Sign Up page. 3. Enter your [employer] Access Code exactly as it appears below. You will not need to use this code after youve completed the sign-up process. If you do not sign up before the expiration date, you must request a new code. · [employer] Access Code: [email or web address] Expires: 8/20/2018 11:21 AM 
 
4. Enter the last four digits of your Social Security Number (xxxx) and Date of Birth (mm/dd/yyyy) as indicated and click Submit.  You will be taken to the next sign-up page. 5. Create a MogiMe ID. This will be your MogiMe login ID and cannot be changed, so think of one that is secure and easy to remember. 6. Create a MogiMe password. You can change your password at any time. 7. Enter your Password Reset Question and Answer. This can be used at a later time if you forget your password. 8. Enter your e-mail address. You will receive e-mail notification when new information is available in [address] 9. Click Sign Up. You can now view and download portions of your medical record. 10. Click the Download Summary menu link to download a portable copy of your medical information. If you have questions, please visit the Frequently Asked Questions section of the MogiMe website. Remember, MogiMe is NOT to be used for urgent needs. For medical emergencies, dial 911. Now available from your iPhone and Android! Please provide this summary of care documentation to your next provider. Your primary care clinician is listed as Jomar Maldonado. If you have any questions after today's visit, please call [phone].

## 2018-08-22 ENCOUNTER — TELEPHONE (OUTPATIENT)
Dept: CARDIOLOGY CLINIC | Age: 43
End: 2018-08-22

## 2018-08-22 NOTE — TELEPHONE ENCOUNTER
Incoming from pt. Two patient Identifiers confirmed. Pt stated that he only received a 30 day supply of norvasc. Advised pt a 90 day supply was sent to pharmacy on 05/22/2018. Advised I would contact pharmacy to advise. Pt verbalized understanding.

## 2018-08-22 NOTE — TELEPHONE ENCOUNTER
Contacted pt at cell number. Two patient Identifiers confirmed. Advised pt per notes below. Pt verbalized understanding.

## 2018-08-22 NOTE — TELEPHONE ENCOUNTER
Contacted pharmacy. Two patient Identifiers confirmed. Advised per conversation below. Per pharmacy tech pt picked up rx ordered by Dr Zeny Karimi and not Dr Cyndi Manzano which was a 30 day supply. She also advised that pts valsartan was on the recall. Advised I would consult with pcp for alternative. Tech verbalized understanding.

## 2018-08-23 RX ORDER — LOSARTAN POTASSIUM 100 MG/1
100 TABLET ORAL DAILY
Qty: 30 TAB | Refills: 6 | Status: SHIPPED | OUTPATIENT
Start: 2018-08-23 | End: 2019-04-05 | Stop reason: SDUPTHER

## 2018-08-23 NOTE — TELEPHONE ENCOUNTER
Contacted pt at cell number. Two patient Identifiers confirmed. Advised pt per Dr China Bee. Pt verbalized understanding and scheduled for 2 week bp check.

## 2018-08-23 NOTE — TELEPHONE ENCOUNTER
Verbal order and read back per Jimena Holman MD  D/C valsartan. Start losartan 100 mg daily. Repeat BP in 2 weeks.

## 2018-09-10 ENCOUNTER — CLINICAL SUPPORT (OUTPATIENT)
Dept: CARDIOLOGY CLINIC | Age: 43
End: 2018-09-10

## 2018-09-10 VITALS
HEART RATE: 89 BPM | SYSTOLIC BLOOD PRESSURE: 161 MMHG | BODY MASS INDEX: 25.52 KG/M2 | WEIGHT: 144 LBS | DIASTOLIC BLOOD PRESSURE: 101 MMHG | HEIGHT: 63 IN

## 2018-09-10 DIAGNOSIS — I10 ESSENTIAL HYPERTENSION: Primary | ICD-10-CM

## 2018-09-10 DIAGNOSIS — I10 ESSENTIAL HYPERTENSION: ICD-10-CM

## 2018-09-10 RX ORDER — ACETAMINOPHEN 500 MG
TABLET ORAL
Qty: 1 KIT | Refills: 0 | Status: SHIPPED | OUTPATIENT
Start: 2018-09-10

## 2018-09-10 RX ORDER — AMLODIPINE BESYLATE 10 MG/1
TABLET ORAL
Qty: 90 TAB | Refills: 3 | Status: SHIPPED | OUTPATIENT
Start: 2018-09-10 | End: 2019-02-07

## 2018-09-10 NOTE — PROGRESS NOTES
Chad Burgos is a 37 y.o. male that is here for a blood pressure check. His current medications are listed below. Current Outpatient Prescriptions   Medication Sig    losartan (COZAAR) 100 mg tablet Take 1 Tab by mouth daily.  amLODIPine (NORVASC) 5 mg tablet TAKE 1 TAB BY MOUTH DAILY.  metoprolol succinate (TOPROL-XL) 50 mg XL tablet TAKE 1 TABLET BY MOUTH DAILY.  omeprazole (PRILOSEC) 40 mg capsule Take 1 Cap by mouth daily.  PROAIR HFA 90 mcg/actuation inhaler TAKE 2 PUFFS BY INHALATION EVERY SIX (6) HOURS AS NEEDED FOR WHEEZING.  LORazepam (ATIVAN) 1 mg tablet Take 1 Tab by mouth every eight (8) hours as needed for Anxiety. Max Daily Amount: 3 mg.  atorvastatin (LIPITOR) 10 mg tablet Take 1 Tab by mouth daily. No current facility-administered medications for this visit. His   Visit Vitals    BP (!) 161/101    Pulse 89    Ht 5' 3\" (1.6 m)    Wt 144 lb (65.3 kg)    BMI 25.51 kg/m2             He took his medications this morning at 7:00am. He denies any cardiac complaints.      Verbal order and read back per Dr. Tess Giron   Increase Norvasc to 10mg Daily

## 2018-09-10 NOTE — PATIENT INSTRUCTIONS
Increase Amlodipine to 10mg Daily     Check Blood pressure at home, if you get machine call with readings in about 2-3 weeks [phone] Safia

## 2018-09-10 NOTE — MR AVS SNAPSHOT
[address] [address] 
[phone] Patient: Liz Clark MRN: [id number] IYW:[birth year] Visit Information Date & Time Provider Department Dept. Phone Encounter #  
 9/10/2018  9:00 AM Bp/Ekg Cristina Torres Cardio Specialist at [place] [address] [id number] Upcoming Health Maintenance Date Due Pneumococcal 19-64 Medium Risk (1 of 1 - PPSV23) 7/7/1994 Influenza Age 5 to Adult 8/1/2018 DTaP/Tdap/Td series (2 - Td) 10/10/2022 Allergies as of 9/10/2018  Review Complete On: 5/22/2018 By: Pastor Jose Alejandro RN Severity Noted Reaction Type Reactions Ace Inhibitors High 10/08/2010   Side Effect Cough Theophylline  03/22/2010    Unknown (comments) Current Immunizations  Never Reviewed Name Date TDAP Vaccine 10/10/2012 Not reviewed this visit You Were Diagnosed With   
  
 Codes Comments Essential hypertension    -  Primary ICD-10-CM: I10 
ICD-9-CM: 401.9 Vitals BP Pulse Height(growth percentile) Weight(growth percentile) BMI Smoking Status (!) 161/101 89 5' 3\" (1.6 m) 144 lb (65.3 kg) 25.51 kg/m2 Former Smoker BMI and BSA Data Body Mass Index Body Surface Area 25.51 kg/m 2 1.7 m 2 Preferred Pharmacy Pharmacy Name Phone [facility] [address], [address] [phone] Your Updated Medication List  
  
   
This list is accurate as of 9/10/18  9:15 AM.  Always use your most recent med list. amLODIPine 5 mg tablet Commonly known as:  Fernandez Mullet TAKE 1 TAB BY MOUTH DAILY. atorvastatin 10 mg tablet Commonly known as:  LIPITOR Take 1 Tab by mouth daily. LORazepam 1 mg tablet Commonly known as:  ATIVAN Take 1 Tab by mouth every eight (8) hours as needed for Anxiety. Max Daily Amount: 3 mg.  
  
 losartan 100 mg tablet Commonly known as:  COZAAR Take 1 Tab by mouth daily. metoprolol succinate 50 mg XL tablet Commonly known as:  TOPROL-XL  
TAKE 1 TABLET BY MOUTH DAILY. omeprazole 40 mg capsule Commonly known as:  PRILOSEC Take 1 Cap by mouth daily. PROAIR HFA 90 mcg/actuation inhaler Generic drug:  albuterol TAKE 2 PUFFS BY INHALATION EVERY SIX (6) HOURS AS NEEDED FOR WHEEZING. Patient Instructions Increase Amlodipine to 10mg Daily Check Blood pressure at home, if you get machine call with readings in about 2-3 weeks [phone] Safia Patient Instructions History Introducing [facility] & [facility] SERVICES! [facility] introduces [employer] patient portal. Now you can access parts of your medical record, email your doctor's office, and request medication refills online. 1. In your internet browser, go to [email or web address] 2. Click on the First Time User? Click Here link in the Sign In box. You will see the New Member Sign Up page. 3. Enter your [employer] Access Code exactly as it appears below. You will not need to use this code after youve completed the sign-up process. If you do not sign up before the expiration date, you must request a new code. · [employer] Access Code: [facility] Expires: 12/9/2018  9:15 AM 
 
4. Enter the last four digits of your Social Security Number (xxxx) and Date of Birth (mm/dd/yyyy) as indicated and click Submit. You will be taken to the next sign-up page. 5. Create a [employer] ID. This will be your [employer] login ID and cannot be changed, so think of one that is secure and easy to remember. 6. Create a [employer] password. You can change your password at any time. 7. Enter your Password Reset Question and Answer. This can be used at a later time if you forget your password. 8. Enter your e-mail address. You will receive e-mail notification when new information is available in [address] 9. Click Sign Up. You can now view and download portions of your medical record. 10. Click the Download Summary menu link to download a portable copy of your medical information. If you have questions, please visit the Frequently Asked Questions section of the [employer] website. Remember, [employer] is NOT to be used for urgent needs. For medical emergencies, dial 911. Now available from your iPhone and Android! Please provide this summary of care documentation to your next provider. Your primary care clinician is listed as Earlean Lefort. If you have any questions after today's visit, please call [phone].

## 2018-10-29 DIAGNOSIS — J45.20 MILD INTERMITTENT ASTHMA WITHOUT COMPLICATION: ICD-10-CM

## 2018-10-29 RX ORDER — CHLORDIAZEPOXIDE HYDROCHLORIDE 25 MG/1
CAPSULE, GELATIN COATED ORAL
COMMUNITY
Start: 2018-09-12 | End: 2018-11-07

## 2018-10-29 RX ORDER — HYDROCODONE BITARTRATE AND ACETAMINOPHEN 5; 325 MG/1; MG/1
TABLET ORAL
COMMUNITY
Start: 2018-09-12 | End: 2018-11-07

## 2018-10-29 RX ORDER — CHLORDIAZEPOXIDE HYDROCHLORIDE 25 MG/1
CAPSULE, GELATIN COATED ORAL
Refills: 0 | COMMUNITY
Start: 2018-09-13 | End: 2018-11-07

## 2018-10-29 NOTE — TELEPHONE ENCOUNTER
Patient is requesting refill of ProAir. Last Filled: 05/11/18  Qty: 1  Refills: 0    Last Visit: 09/05/17  No future appointments.

## 2018-10-30 RX ORDER — ALBUTEROL SULFATE 90 UG/1
AEROSOL, METERED RESPIRATORY (INHALATION)
Qty: 8.5 INHALER | Refills: 0 | OUTPATIENT
Start: 2018-10-30

## 2018-11-07 ENCOUNTER — HOSPITAL ENCOUNTER (OUTPATIENT)
Dept: LAB | Age: 43
Discharge: HOME | End: 2018-11-07
Payer: COMMERCIAL

## 2018-11-07 ENCOUNTER — OFFICE VISIT (OUTPATIENT)
Dept: FAMILY MEDICINE CLINIC | Age: 43
End: 2018-11-07

## 2018-11-07 VITALS
RESPIRATION RATE: 18 BRPM | TEMPERATURE: 98.2 F | OXYGEN SATURATION: 99 % | BODY MASS INDEX: 24.98 KG/M2 | DIASTOLIC BLOOD PRESSURE: 86 MMHG | HEART RATE: 95 BPM | HEIGHT: 63 IN | SYSTOLIC BLOOD PRESSURE: 140 MMHG | WEIGHT: 141 LBS

## 2018-11-07 DIAGNOSIS — I10 ESSENTIAL HYPERTENSION: ICD-10-CM

## 2018-11-07 DIAGNOSIS — R35.0 URINARY FREQUENCY: Primary | ICD-10-CM

## 2018-11-07 DIAGNOSIS — J45.20 MILD INTERMITTENT ASTHMA WITHOUT COMPLICATION: ICD-10-CM

## 2018-11-07 DIAGNOSIS — R73.01 IMPAIRED FASTING GLUCOSE: ICD-10-CM

## 2018-11-07 DIAGNOSIS — E78.2 MIXED HYPERLIPIDEMIA: ICD-10-CM

## 2018-11-07 DIAGNOSIS — R35.0 URINARY FREQUENCY: ICD-10-CM

## 2018-11-07 LAB
BASOPHILS # BLD: 0 K/UL (ref 0–0.1)
BASOPHILS NFR BLD: 0 % (ref 0–2)
BILIRUB UR QL STRIP: NEGATIVE
DIFFERENTIAL METHOD BLD: ABNORMAL
EOSINOPHIL # BLD: 0 K/UL (ref 0–0.4)
EOSINOPHIL NFR BLD: 1 % (ref 0–5)
ERYTHROCYTE [DISTWIDTH] IN BLOOD BY AUTOMATED COUNT: 12.6 % (ref 11.6–14.5)
EST. AVERAGE GLUCOSE BLD GHB EST-MCNC: 108 MG/DL
GLUCOSE UR-MCNC: NEGATIVE MG/DL
HBA1C MFR BLD: 5.4 % (ref 4.2–5.6)
HCT VFR BLD AUTO: 44 % (ref 36–48)
HGB BLD-MCNC: 14.4 G/DL (ref 13–16)
KETONES P FAST UR STRIP-MCNC: NEGATIVE MG/DL
LYMPHOCYTES # BLD: 0.8 K/UL (ref 0.9–3.6)
LYMPHOCYTES NFR BLD: 10 % (ref 21–52)
MCH RBC QN AUTO: 30.5 PG (ref 24–34)
MCHC RBC AUTO-ENTMCNC: 32.7 G/DL (ref 31–37)
MCV RBC AUTO: 93.2 FL (ref 74–97)
MONOCYTES # BLD: 0.9 K/UL (ref 0.05–1.2)
MONOCYTES NFR BLD: 11 % (ref 3–10)
NEUTS SEG # BLD: 6.2 K/UL (ref 1.8–8)
NEUTS SEG NFR BLD: 78 % (ref 40–73)
PH UR STRIP: 9 [PH] (ref 4.6–8)
PLATELET # BLD AUTO: 221 K/UL (ref 135–420)
PMV BLD AUTO: 10.2 FL (ref 9.2–11.8)
PROT UR QL STRIP: ABNORMAL
RBC # BLD AUTO: 4.72 M/UL (ref 4.7–5.5)
SP GR UR STRIP: 1.01 (ref 1–1.03)
UA UROBILINOGEN AMB POC: ABNORMAL (ref 0.2–1)
URINALYSIS CLARITY POC: CLEAR
URINALYSIS COLOR POC: YELLOW
URINE BLOOD POC: NEGATIVE
URINE LEUKOCYTES POC: NEGATIVE
URINE NITRITES POC: NEGATIVE
WBC # BLD AUTO: 8.1 K/UL (ref 4.6–13.2)

## 2018-11-07 PROCEDURE — 80053 COMPREHEN METABOLIC PANEL: CPT | Performed by: INTERNAL MEDICINE

## 2018-11-07 PROCEDURE — 84443 ASSAY THYROID STIM HORMONE: CPT | Performed by: INTERNAL MEDICINE

## 2018-11-07 PROCEDURE — 36415 COLL VENOUS BLD VENIPUNCTURE: CPT | Performed by: INTERNAL MEDICINE

## 2018-11-07 PROCEDURE — 87086 URINE CULTURE/COLONY COUNT: CPT | Performed by: INTERNAL MEDICINE

## 2018-11-07 PROCEDURE — 85025 COMPLETE CBC W/AUTO DIFF WBC: CPT | Performed by: INTERNAL MEDICINE

## 2018-11-07 PROCEDURE — 83036 HEMOGLOBIN GLYCOSYLATED A1C: CPT | Performed by: INTERNAL MEDICINE

## 2018-11-07 PROCEDURE — 80061 LIPID PANEL: CPT | Performed by: INTERNAL MEDICINE

## 2018-11-07 RX ORDER — TAMSULOSIN HYDROCHLORIDE 0.4 MG/1
0.4 CAPSULE ORAL DAILY
Qty: 15 CAP | Refills: 0 | Status: SHIPPED | OUTPATIENT
Start: 2018-11-07 | End: 2019-02-07

## 2018-11-07 RX ORDER — ALBUTEROL SULFATE 90 UG/1
AEROSOL, METERED RESPIRATORY (INHALATION)
Qty: 1 INHALER | Refills: 2 | Status: SHIPPED | OUTPATIENT
Start: 2018-11-07

## 2018-11-07 RX ORDER — CIPROFLOXACIN 500 MG/1
500 TABLET ORAL 2 TIMES DAILY
Qty: 30 TAB | Refills: 0 | Status: SHIPPED | OUTPATIENT
Start: 2018-11-07 | End: 2018-11-17

## 2018-11-07 NOTE — PROGRESS NOTES
HISTORY OF PRESENT ILLNESS Dulce Knight is a 37 y.o. male. HPI 
C/o started yesterday with urinary frequency/urgecy and weak stream/low abdominal pain, no hematuria, had chills yesterday but not today IFG, stable, last a1c was 5.6, glucose 99, but that was over a year ago HLD, not controlled, he is not taking lipitor, will repeat labs Asthma, intermittent, stable, use Albuterol prn occasionally, need refills HTN, stable, bp is controlled Seen by counseling for ETOH abuse, stated he did not have any for 3 weeks Review of Systems Constitutional: Negative for fever. Respiratory: Negative for cough and shortness of breath. Cardiovascular: Negative for chest pain. Genitourinary: Negative for flank pain and hematuria. Neurological: Negative for dizziness and headaches. Psychiatric/Behavioral: The patient does not have insomnia. Physical Exam  
Constitutional: He is oriented to person, place, and time. No distress. Cardiovascular: Normal rate, regular rhythm and normal heart sounds. No murmur heard. Pulmonary/Chest: Effort normal and breath sounds normal. No respiratory distress. He has no wheezes. Abdominal: Soft. There is tenderness in the suprapubic area. There is no CVA tenderness. Genitourinary:  
Genitourinary Comments: Declined MARIAMA Musculoskeletal: He exhibits edema. Neurological: He is alert and oriented to person, place, and time. Psychiatric: He has a normal mood and affect. Vitals reviewed. ASSESSMENT and PLAN Diagnoses and all orders for this visit: 
 
1. Urinary frequency, most likely UTI/prostatitis -     AMB POC URINALYSIS DIP STICK AUTO W/O MICRO 
-     CULTURE, URINE; Future -     tamsulosin (FLOMAX) 0.4 mg capsule; Take 1 Cap by mouth daily. -     ciprofloxacin HCl (CIPRO) 500 mg tablet; Take 1 Tab by mouth two (2) times a day for 10 days. 2. Impaired fasting glucose, stable 
-     CBC WITH AUTOMATED DIFF; Future -     HEMOGLOBIN A1C WITH EAG; Future -     LIPID PANEL; Future -     METABOLIC PANEL, COMPREHENSIVE; Future 
-     TSH 3RD GENERATION; Future 3. Mixed hyperlipidemia, not controlled,  
-     CBC WITH AUTOMATED DIFF; Future -     LIPID PANEL; Future -     METABOLIC PANEL, COMPREHENSIVE; Future 
-     TSH 3RD GENERATION; Future 4. Essential hypertension, stable 
-     CBC WITH AUTOMATED DIFF; Future -     METABOLIC PANEL, COMPREHENSIVE; Future 
-     TSH 3RD GENERATION; Future 5. Mild intermittent asthma without complication, stable 
-     albuterol (PROAIR HFA) 90 mcg/actuation inhaler; TAKE 2 PUFFS BY INHALATION EVERY SIX (6) HOURS AS NEEDED FOR WHEEZING. rtc 1 week Lab Results Component Value Date/Time Hemoglobin A1c 5.6 10/31/2014 09:39 AM  
 
Lab Results Component Value Date/Time Sodium 138 04/07/2017 10:01 AM  
 Potassium 4.8 04/07/2017 10:01 AM  
 Chloride 103 04/07/2017 10:01 AM  
 CO2 29 04/07/2017 10:01 AM  
 Anion gap 6 04/07/2017 10:01 AM  
 Glucose 99 04/07/2017 10:01 AM  
 BUN 10 04/07/2017 10:01 AM  
 Creatinine 0.82 04/07/2017 10:01 AM  
 BUN/Creatinine ratio 12 04/07/2017 10:01 AM  
 GFR est AA >60 04/07/2017 10:01 AM  
 GFR est non-AA >60 04/07/2017 10:01 AM  
 Calcium 9.2 04/07/2017 10:01 AM  
 Bilirubin, total 0.5 04/07/2017 10:01 AM  
 AST (SGOT) 30 04/07/2017 10:01 AM  
 Alk.  phosphatase 58 04/07/2017 10:01 AM  
 Protein, total 7.8 04/07/2017 10:01 AM  
 Albumin 4.0 04/07/2017 10:01 AM  
 Globulin 3.8 04/07/2017 10:01 AM  
 A-G Ratio 1.1 04/07/2017 10:01 AM  
 ALT (SGPT) 40 04/07/2017 10:01 AM

## 2018-11-07 NOTE — PROGRESS NOTES
Santana Hendrickson is a 37 y.o. male (: 1975) presenting to address: Chief Complaint Patient presents with  Urinary Frequency Vitals:  
 18 1303 BP: (!) 142/92 Pulse: 95 Resp: 18 Temp: 98.2 °F (36.8 °C) TempSrc: Oral  
SpO2: 99% Weight: 141 lb (64 kg) Height: 5' 3\" (1.6 m) PainSc:   8 PainLoc: Abdomen Hearing/Vision: No exam data present Learning Assessment:  
 
Learning Assessment 2015 PRIMARY LEARNER Patient HIGHEST LEVEL OF EDUCATION - PRIMARY LEARNER  2 YEARS OF COLLEGE  
BARRIERS PRIMARY LEARNER -  
PRIMARY LANGUAGE ENGLISH  
LEARNER PREFERENCE PRIMARY READING  
  -  
ANSWERED BY Patient RELATIONSHIP SELF Depression Screening: PHQ over the last two weeks 2018 Little interest or pleasure in doing things Not at all Feeling down, depressed, irritable, or hopeless Not at all Total Score PHQ 2 0 Fall Risk Assessment:  
No flowsheet data found. Abuse Screening:  
 
Abuse Screening Questionnaire 2017 Do you ever feel afraid of your partner? Yemi Fruit Are you in a relationship with someone who physically or mentally threatens you? Putnam Fruit Is it safe for you to go home? Alina Byers Coordination of Care Questionaire: 1. Have you been to the ER, urgent care clinic since your last visit? Hospitalized since your last visit? YES miguel 2. Have you seen or consulted any other health care providers outside of the [address] [place] [address] since your last visit? Include any pap smears or colon screening. NO Advanced Directive: 1. Do you have an Advanced Directive? NO 
 
2. Would you like information on Advanced Directives?  NO

## 2018-11-08 LAB
ALBUMIN SERPL-MCNC: 4.4 G/DL (ref 3.4–5)
ALBUMIN/GLOB SERPL: 1.2 {RATIO} (ref 0.8–1.7)
ALP SERPL-CCNC: 67 U/L (ref 45–117)
ALT SERPL-CCNC: 25 U/L (ref 16–61)
ANION GAP SERPL CALC-SCNC: 9 MMOL/L (ref 3–18)
AST SERPL-CCNC: 14 U/L (ref 15–37)
BILIRUB SERPL-MCNC: 0.9 MG/DL (ref 0.2–1)
BUN SERPL-MCNC: 7 MG/DL (ref 7–18)
BUN/CREAT SERPL: 8 (ref 12–20)
CALCIUM SERPL-MCNC: 9.4 MG/DL (ref 8.5–10.1)
CHLORIDE SERPL-SCNC: 98 MMOL/L (ref 100–108)
CHOLEST SERPL-MCNC: 204 MG/DL
CO2 SERPL-SCNC: 26 MMOL/L (ref 21–32)
CREAT SERPL-MCNC: 0.93 MG/DL (ref 0.6–1.3)
GLOBULIN SER CALC-MCNC: 3.6 G/DL (ref 2–4)
GLUCOSE SERPL-MCNC: 111 MG/DL (ref 74–99)
HDLC SERPL-MCNC: 114 MG/DL (ref 40–60)
HDLC SERPL: 1.8 {RATIO} (ref 0–5)
LDLC SERPL CALC-MCNC: 74.6 MG/DL (ref 0–100)
LIPID PROFILE,FLP: ABNORMAL
POTASSIUM SERPL-SCNC: 4 MMOL/L (ref 3.5–5.5)
PROT SERPL-MCNC: 8 G/DL (ref 6.4–8.2)
SODIUM SERPL-SCNC: 133 MMOL/L (ref 136–145)
TRIGL SERPL-MCNC: 77 MG/DL (ref ?–150)
TSH SERPL DL<=0.05 MIU/L-ACNC: 2.5 UIU/ML (ref 0.36–3.74)
VLDLC SERPL CALC-MCNC: 15.4 MG/DL

## 2018-11-09 LAB
BACTERIA SPEC CULT: NORMAL
SERVICE CMNT-IMP: NORMAL

## 2018-11-14 NOTE — PROGRESS NOTES
His cholesterol is good,   His prediabetes is stable, controlled  Liver enzymes are normal  Pt was suppose to follow up this week !!

## 2019-02-07 ENCOUNTER — OFFICE VISIT (OUTPATIENT)
Dept: FAMILY MEDICINE CLINIC | Age: 44
End: 2019-02-07

## 2019-02-07 VITALS
RESPIRATION RATE: 16 BRPM | HEIGHT: 63 IN | TEMPERATURE: 98.3 F | HEART RATE: 95 BPM | BODY MASS INDEX: 25.52 KG/M2 | SYSTOLIC BLOOD PRESSURE: 170 MMHG | DIASTOLIC BLOOD PRESSURE: 110 MMHG | WEIGHT: 144 LBS | OXYGEN SATURATION: 98 %

## 2019-02-07 DIAGNOSIS — J06.9 UPPER RESPIRATORY TRACT INFECTION, UNSPECIFIED TYPE: Primary | ICD-10-CM

## 2019-02-07 RX ORDER — AMOXICILLIN AND CLAVULANATE POTASSIUM 875; 125 MG/1; MG/1
1 TABLET, FILM COATED ORAL 2 TIMES DAILY
Qty: 20 TAB | Refills: 0 | Status: SHIPPED | OUTPATIENT
Start: 2019-02-07 | End: 2019-02-17

## 2019-02-07 RX ORDER — HYDROCODONE POLISTIREX AND CHLORPHENIRAMINE POLISTIREX 10; 8 MG/5ML; MG/5ML
5 SUSPENSION, EXTENDED RELEASE ORAL
Qty: 75 ML | Refills: 0 | OUTPATIENT
Start: 2019-02-07 | End: 2020-01-06

## 2019-02-07 NOTE — PROGRESS NOTES
Dorie Perez is a 37 y.o. male (: 1975) presenting to address:    Chief Complaint   Patient presents with    Cough     ольга's        Vitals:    19 1407   BP: (!) 170/110   Pulse: 95   Resp: 16   Temp: 98.3 °F (36.8 °C)   TempSrc: Oral   SpO2: 98%   Weight: 144 lb (65.3 kg)   Height: 5' 3\" (1.6 m)   PainSc:   0 - No pain       Hearing/Vision:   No exam data present    Learning Assessment:     Learning Assessment 2015   PRIMARY LEARNER Patient   HIGHEST LEVEL OF EDUCATION - PRIMARY LEARNER  2 YEARS OF COLLEGE   BARRIERS PRIMARY LEARNER -   PRIMARY LANGUAGE ENGLISH   LEARNER PREFERENCE PRIMARY READING     -   ANSWERED BY Patient   RELATIONSHIP SELF     Depression Screening:     PHQ over the last two weeks 2018   Little interest or pleasure in doing things Not at all   Feeling down, depressed, irritable, or hopeless Not at all   Total Score PHQ 2 0     Fall Risk Assessment:   No flowsheet data found. Abuse Screening:     Abuse Screening Questionnaire 2017   Do you ever feel afraid of your partner? N   Are you in a relationship with someone who physically or mentally threatens you? N   Is it safe for you to go home? Y     Coordination of Care Questionaire:   1. Have you been to the ER, urgent care clinic since your last visit? Hospitalized since your last visit? NO    2. Have you seen or consulted any other health care providers outside of the [address] [place] [address] since your last visit? Include any pap smears or colon screening. NO    Advanced Directive:   1. Do you have an Advanced Directive? NO    2. Would you like information on Advanced Directives?  NO

## 2019-02-07 NOTE — PATIENT INSTRUCTIONS

## 2019-02-07 NOTE — PROGRESS NOTES
Assessment/Plan:    *Diagnoses and all orders for this visit:    1. Upper respiratory tract infection, unspecified type  -     amoxicillin-clavulanate (AUGMENTIN) 875-125 mg per tablet; Take 1 Tab by mouth two (2) times a day for 10 days. -     chlorpheniramine-HYDROcodone (TUSSIONEX) 10-8 mg/5 mL suspension; Take 5 mL by mouth nightly as needed for Cough. Max Daily Amount: 5 mL. Advised to complete Abx course. Will call if not better. The plan was discussed with the patient. The patient verbalized understanding and is in agreement with the plan. All medication potential side effects were discussed with the patient.    -------------------------------------------------------------------------------------------------------------------        Elvina Olszewski is a 37 y.o. male and presents with Cough (zarbee's ) and Chills         Subjective:  Pt has been coughing x 5 days, has been around sick co-workers. No fevers, bad cough, + chills. Has been taking Zarbees OTC but has not helped. ROS:  Review of Systems - Negative except as above        The problem list was updated as a part of today's visit. Patient Active Problem List   Diagnosis Code    Essential hypertension I10    Other and unspecified hyperlipidemia E78.5    Asthma J45.909    Impaired fasting glucose R73.01    Acute pancreatitis K85.90    Alcohol abuse, daily use F10.10    Dehydration E86.0    Increased anion gap metabolic acidosis S88.9    Transaminitis R74.0       The PSH, FH were reviewed.         SH:  Social History     Tobacco Use    Smoking status: Former Smoker     Years: 1.00    Smokeless tobacco: Former User   Substance Use Topics    Alcohol use: No     Alcohol/week: 0.0 oz    Drug use: Not on file       Medications/Allergies:  Current Outpatient Medications on File Prior to Visit   Medication Sig Dispense Refill    albuterol (PROAIR HFA) 90 mcg/actuation inhaler TAKE 2 PUFFS BY INHALATION EVERY SIX (6) HOURS AS NEEDED FOR WHEEZING. 1 Inhaler 2    Blood Pressure Monitor kit Take blood pressure as needed -be sure at least 2 hours after taking medications 1 Kit 0    losartan (COZAAR) 100 mg tablet Take 1 Tab by mouth daily. 30 Tab 6    metoprolol succinate (TOPROL-XL) 50 mg XL tablet TAKE 1 TABLET BY MOUTH DAILY. 90 Tab 3    atorvastatin (LIPITOR) 10 mg tablet Take 1 Tab by mouth daily. 30 Tab 3     No current facility-administered medications on file prior to visit. Allergies   Allergen Reactions    Ace Inhibitors Cough    Theophylline Unknown (comments)         Health Maintenance:   Health Maintenance   Topic Date Due    Pneumococcal 19-64 Medium Risk (1 of 1 - PPSV23) 07/07/1994    Influenza Age 5 to Adult  08/01/2018    DTaP/Tdap/Td series (2 - Td) 10/10/2022       Objective:  Visit Vitals  BP (!) 170/110 (BP 1 Location: Left arm, BP Patient Position: Sitting)   Pulse 95   Temp 98.3 °F (36.8 °C) (Oral)   Resp 16   Ht 5' 3\" (1.6 m)   Wt 144 lb (65.3 kg)   SpO2 98%   BMI 25.51 kg/m²          Nurses notes and VS reviewed.       Physical Examination: General appearance - ill-appearing  Ears - bilateral TM's and external ear canals normal  Nose - mucosal congestion  Mouth - erythematous  Chest - rhonchi noted scattered  Heart - S1 and S2 normal, tachycardic          Labwork and Ancillary Studies:    CBC w/Diff  Lab Results   Component Value Date/Time    WBC 8.1 11/07/2018 02:03 PM    HGB 14.4 11/07/2018 02:03 PM    PLATELET 548 75/18/3417 02:03 PM         Basic Metabolic Profile  Lab Results   Component Value Date/Time    Sodium 133 (L) 11/07/2018 02:03 PM    Potassium 4.0 11/07/2018 02:03 PM    Chloride 98 (L) 11/07/2018 02:03 PM    CO2 26 11/07/2018 02:03 PM    Anion gap 9 11/07/2018 02:03 PM    Glucose 111 (H) 11/07/2018 02:03 PM    BUN 7 11/07/2018 02:03 PM    Creatinine 0.93 11/07/2018 02:03 PM    BUN/Creatinine ratio 8 (L) 11/07/2018 02:03 PM    GFR est AA >60 11/07/2018 02:03 PM    GFR est non-AA >60 11/07/2018 02:03 PM    Calcium 9.4 11/07/2018 02:03 PM         LFT  Lab Results   Component Value Date/Time    ALT (SGPT) 25 11/07/2018 02:03 PM    AST (SGOT) 14 (L) 11/07/2018 02:03 PM    Alk.  phosphatase 67 11/07/2018 02:03 PM    Bilirubin, total 0.9 11/07/2018 02:03 PM         Cholesterol  Lab Results   Component Value Date/Time    Cholesterol, total 204 (H) 11/07/2018 02:03 PM    HDL Cholesterol 114 (H) 11/07/2018 02:03 PM    LDL, calculated 74.6 11/07/2018 02:03 PM    Triglyceride 77 11/07/2018 02:03 PM    CHOL/HDL Ratio 1.8 11/07/2018 02:03 PM

## 2019-04-05 NOTE — TELEPHONE ENCOUNTER
PCP: Ravi Olivo MD    Last appt: 9/10/2018  No future appointments. Requested Prescriptions     Pending Prescriptions Disp Refills    losartan (COZAAR) 100 mg tablet 30 Tab 6     Sig: Take 1 Tab by mouth daily.

## 2019-04-08 RX ORDER — LOSARTAN POTASSIUM 100 MG/1
100 TABLET ORAL DAILY
Qty: 30 TAB | Refills: 6 | Status: SHIPPED | OUTPATIENT
Start: 2019-04-08 | End: 2019-10-22 | Stop reason: SDUPTHER

## 2019-06-14 DIAGNOSIS — I10 ESSENTIAL HYPERTENSION: ICD-10-CM

## 2019-06-17 RX ORDER — METOPROLOL SUCCINATE 50 MG/1
TABLET, EXTENDED RELEASE ORAL
Qty: 90 TAB | Refills: 0 | Status: SHIPPED | OUTPATIENT
Start: 2019-06-17 | End: 2019-09-20 | Stop reason: SDUPTHER

## 2019-09-20 DIAGNOSIS — I10 ESSENTIAL HYPERTENSION: ICD-10-CM

## 2019-09-20 NOTE — TELEPHONE ENCOUNTER
PCP: Carly Sainz MD    Last appt: 9/10/2018  No future appointments. Requested Prescriptions     Pending Prescriptions Disp Refills    metoprolol succinate (TOPROL-XL) 50 mg XL tablet 30 Tab 0     Sig: Take 1 tab by mouth daily. Appt required before further refills can be authorized.

## 2019-09-24 RX ORDER — METOPROLOL SUCCINATE 50 MG/1
TABLET, EXTENDED RELEASE ORAL
Qty: 30 TAB | Refills: 0 | Status: SHIPPED | OUTPATIENT
Start: 2019-09-24 | End: 2019-10-22 | Stop reason: SDUPTHER

## 2019-10-22 DIAGNOSIS — I10 ESSENTIAL HYPERTENSION: ICD-10-CM

## 2019-10-22 RX ORDER — LOSARTAN POTASSIUM 100 MG/1
100 TABLET ORAL DAILY
Qty: 30 TAB | Refills: 6 | Status: SHIPPED | OUTPATIENT
Start: 2019-10-22 | End: 2020-09-04 | Stop reason: SDUPTHER

## 2019-10-22 RX ORDER — METOPROLOL SUCCINATE 50 MG/1
TABLET, EXTENDED RELEASE ORAL
Qty: 30 TAB | Refills: 6 | Status: SHIPPED | OUTPATIENT
Start: 2019-10-22 | End: 2020-05-28 | Stop reason: SDUPTHER

## 2019-11-04 RX ORDER — LOSARTAN POTASSIUM 100 MG/1
TABLET ORAL
Qty: 90 TAB | Refills: 2 | Status: SHIPPED | OUTPATIENT
Start: 2019-11-04 | End: 2020-09-04 | Stop reason: SDUPTHER

## 2019-11-07 ENCOUNTER — OFFICE VISIT (OUTPATIENT)
Dept: CARDIOLOGY CLINIC | Age: 44
End: 2019-11-07

## 2019-11-07 VITALS
BODY MASS INDEX: 25.34 KG/M2 | OXYGEN SATURATION: 98 % | SYSTOLIC BLOOD PRESSURE: 166 MMHG | HEART RATE: 101 BPM | HEIGHT: 63 IN | DIASTOLIC BLOOD PRESSURE: 105 MMHG | WEIGHT: 143 LBS

## 2019-11-07 DIAGNOSIS — I11.9 HYPERTENSIVE HEART DISEASE WITHOUT HEART FAILURE: Primary | ICD-10-CM

## 2019-11-07 RX ORDER — AMLODIPINE BESYLATE 10 MG/1
10 TABLET ORAL DAILY
Qty: 30 TAB | Refills: 6 | Status: SHIPPED | OUTPATIENT
Start: 2019-11-07 | End: 2020-05-28 | Stop reason: SDUPTHER

## 2019-11-07 NOTE — PATIENT INSTRUCTIONS
Please call central scheduling at [phone]  -cfdd    All testing is completed at [address] [facility]     Bring home machine when you come in for blood pressure check     Increase Norvasc to 10mg Daily

## 2019-11-07 NOTE — PROGRESS NOTES
1. Have you been to the ER, urgent care clinic since your last visit? Hospitalized since your last visit? No    2. Have you seen or consulted any other health care providers outside of the [address] [place] [address] since your last visit? Include any pap smears or colon screening.  No

## 2019-11-07 NOTE — PROGRESS NOTES
[facility]    Mr. Trudy Willard is a 40 y.o. male with a history of hypertension, alcohol dependence, anxiety disorder. Patient is here today for follow-up appointment. On last visit we ordered echocardiogram to rule out hypertensive cardiovascular heart disease however he did not get it done. He claims that he is taking his medication regularly. Usually at home his blood pressure runs 811-960 systolic and 41-73D diastolic. He denies any headache, vision changes. He denies any resting or exertional chest pain or chest tightness. He denies PND or lower extremity swelling  Denies any nausea, vomiting, abdominal pain, fever, chills, sputum production. No hematuria or other bleeding complaints    Past Medical History:   Diagnosis Date    Asthma     Depression     EtOH dependence ([facility] [address]     Hypercholesterolemia     Hypertension     Non compliance w medication regimen     Pancreatitis, acute 4-2014         Past Surgical History:   Procedure Laterality Date    HX HERNIA REPAIR         Current Outpatient Medications   Medication Sig    losartan (COZAAR) 100 mg tablet TAKE 1 TABLET BY MOUTH EVERY DAY    metoprolol succinate (TOPROL-XL) 50 mg XL tablet One tab Once daily    losartan (COZAAR) 100 mg tablet Take 1 Tab by mouth daily.  chlorpheniramine-HYDROcodone (TUSSIONEX) 10-8 mg/5 mL suspension Take 5 mL by mouth nightly as needed for Cough. Max Daily Amount: 5 mL.  albuterol (PROAIR HFA) 90 mcg/actuation inhaler TAKE 2 PUFFS BY INHALATION EVERY SIX (6) HOURS AS NEEDED FOR WHEEZING.  Blood Pressure Monitor kit Take blood pressure as needed -be sure at least 2 hours after taking medications    atorvastatin (LIPITOR) 10 mg tablet Take 1 Tab by mouth daily. No current facility-administered medications for this visit.         Allergies and Sensitivities:  Allergies   Allergen Reactions    Ace Inhibitors Cough    Theophylline Unknown (comments)       Family History:  Family History   Problem Relation Age of Onset    Hypertension Mother     Hypertension Father     Hypertension Maternal Grandmother     Hypertension Maternal Grandfather     Diabetes Maternal Grandfather     High Cholesterol Maternal Grandfather        Social History:  Social History     Tobacco Use    Smoking status: Former Smoker     Years: 1.00    Smokeless tobacco: Former User   Substance Use Topics    Alcohol use: No     Alcohol/week: 0.0 standard drinks    Drug use: Not on file     He  reports that he has quit smoking. He quit after 1.00 year of use. He has quit using smokeless tobacco.  He  reports that he does not drink alcohol. Review of Systems:  Cardiac symptoms as noted above in HPI. All others negative. Denies fatigue, malaise, skin rash, joint pain, blurring vision, photophobia, neck pain, hemoptysis, chronic cough, nausea, vomiting, hematuria, burning micturition, BRBPR, chronic headaches. Physical Exam:  BP Readings from Last 3 Encounters:   11/07/19 (!) 166/105   02/07/19 (!) 170/110   11/07/18 140/86         Pulse Readings from Last 3 Encounters:   11/07/19 (!) 101   02/07/19 95   11/07/18 95          Wt Readings from Last 3 Encounters:   11/07/19 143 lb (64.9 kg)   02/07/19 144 lb (65.3 kg)   11/07/18 141 lb (64 kg)       Constitutional: Oriented to person, place, and time. HENT: Head: Normocephalic and atraumatic. Neck: No JVD present. Carotid bruit is not appreciated. Cardiovascular: Regular rhythm. No murmur, gallop or rubs appreciated  Lung: Breath sounds normal. No respiratory distress. No ronchi or rales appreciated  Abdominal: No tenderness. No rebound and no guarding. Musculoskeletal: There is no lower extremity edema.  No cynosis    Review of Data  LABS:   Lab Results   Component Value Date/Time    Sodium 133 (L) 11/07/2018 02:03 PM    Potassium 4.0 11/07/2018 02:03 PM    Chloride 98 (L) 11/07/2018 02:03 PM    CO2 26 11/07/2018 02:03 PM    Glucose 111 (H) 11/07/2018 02:03 PM    BUN 7 11/07/2018 02:03 PM    Creatinine 0.93 11/07/2018 02:03 PM     Lipids Latest Ref Rng & Units 11/7/2018 4/7/2017   Chol, Total <200 MG/(H) 222(H)   HDL 40 - 60 MG/(H) 72(H)   LDL 0 - 100 MG/DL 74.6 121. 2(H)   Trig <150 MG/DL 77 144   Chol/HDL Ratio 0 - 5.0   1.8 3.1   Some recent data might be hidden     Lab Results   Component Value Date/Time    ALT (SGPT) 25 11/07/2018 02:03 PM     Lab Results   Component Value Date/Time    Hemoglobin A1c 5.4 11/07/2018 02:03 PM       EKG (05/18) Sinus rhythm. Normal IL interval. NO ST changes of ischemia    ECHO    IMPRESSION & PLAN:  Mr. Lawrence Anderson is a 40 y.o. male with a history of hypertension, alcohol dependence and anxiety disorder. Hypertension:   Mr. Lawrence Anderson has a longstanding history of hypertension. Currently he is on 3 hypertensive medication including Toprol, losartan and amlodipine. Will increase amlodipine to 10 mg daily. Have asked patient to check his blood pressure at home make a diary and come back in 2 weeks with his blood pressure machine for calibration. Will order echocardiogram to rule out hypertensive cardia vascular heart disease because of resistant hypertension. Importance of salt restriction, regular exercise and also dietary potassium intake was discussed. He did have a renal doppler done in 2017, which did not show any renal artery stenosis. Alcohol dependence:  Mr. Lawrence Anderson has reduced drinking. An echocardiogram will be ordered to rule out alcohol related cardiomyopathy. Importance of diet and exercise was discussed with patient. This plan was discussed with patient who is in agreement. Thank you for allowing me to participate in patient care. Please feel free to call me if you have any question or concern. Liudmila Cowan MD  Please note: This document has been produced using voice recognition software.  Unrecognized errors in transcription may be present.

## 2019-11-12 ENCOUNTER — TELEPHONE (OUTPATIENT)
Dept: CARDIOLOGY CLINIC | Age: 44
End: 2019-11-12

## 2019-11-12 NOTE — TELEPHONE ENCOUNTER
Patient called the office and states that yesterday he noticed his right leg was starting to swell, he states that today it is so swollen he cant hardly bend it. Also now it is very red, hot and itches. Advised will discuss with Dr. Izzy Hensley and call him back with recommendations.      Verbal order and read back per Shalini Garvin MD  Patient needs to go to ER be evaluated for DVT     Patient aware

## 2019-11-21 ENCOUNTER — CLINICAL SUPPORT (OUTPATIENT)
Dept: CARDIOLOGY CLINIC | Age: 44
End: 2019-11-21

## 2019-11-21 VITALS
BODY MASS INDEX: 25.33 KG/M2 | HEIGHT: 63 IN | HEART RATE: 84 BPM | DIASTOLIC BLOOD PRESSURE: 87 MMHG | SYSTOLIC BLOOD PRESSURE: 144 MMHG

## 2019-11-21 DIAGNOSIS — I10 HYPERTENSION, UNSPECIFIED TYPE: Primary | ICD-10-CM

## 2019-11-21 NOTE — PROGRESS NOTES
Dipika Lopez is a 40 y.o. male that is here for a blood pressure check. His current medications are listed below. Current Outpatient Medications   Medication Sig    amLODIPine (NORVASC) 10 mg tablet Take 1 Tab by mouth daily.  losartan (COZAAR) 100 mg tablet TAKE 1 TABLET BY MOUTH EVERY DAY    metoprolol succinate (TOPROL-XL) 50 mg XL tablet One tab Once daily    losartan (COZAAR) 100 mg tablet Take 1 Tab by mouth daily.  chlorpheniramine-HYDROcodone (TUSSIONEX) 10-8 mg/5 mL suspension Take 5 mL by mouth nightly as needed for Cough. Max Daily Amount: 5 mL.  albuterol (PROAIR HFA) 90 mcg/actuation inhaler TAKE 2 PUFFS BY INHALATION EVERY SIX (6) HOURS AS NEEDED FOR WHEEZING.  Blood Pressure Monitor kit Take blood pressure as needed -be sure at least 2 hours after taking medications    atorvastatin (LIPITOR) 10 mg tablet Take 1 Tab by mouth daily. No current facility-administered medications for this visit. His   Visit Vitals  /87   Pulse 84   Ht 5' 3\" (1.6 m)   BMI 25.33 kg/m²             He is here since the increase in Norvasc to 10mg daily. He took his medications today, he has no cardiac complaints. He was supposed to bring his home machine and he states he did forget to bring that as well as his home BP readings. He states at home his blood pressure has been running ok. Verbal order and read back per Gen Monson MD   Continue same. Patient should still come in sometime with home machine to be sure it is accurate. Patient aware and verbalized understanding.

## 2020-01-06 ENCOUNTER — HOSPITAL ENCOUNTER (EMERGENCY)
Age: 45
Discharge: HOME | End: 2020-01-06
Attending: EMERGENCY MEDICINE
Payer: COMMERCIAL

## 2020-01-06 ENCOUNTER — APPOINTMENT (OUTPATIENT)
Dept: GENERAL RADIOLOGY | Age: 45
End: 2020-01-06
Attending: PHYSICIAN ASSISTANT
Payer: COMMERCIAL

## 2020-01-06 VITALS
OXYGEN SATURATION: 100 % | SYSTOLIC BLOOD PRESSURE: 186 MMHG | DIASTOLIC BLOOD PRESSURE: 119 MMHG | HEART RATE: 89 BPM | RESPIRATION RATE: 16 BRPM | TEMPERATURE: 99.1 F

## 2020-01-06 DIAGNOSIS — J02.9 SORETHROAT: ICD-10-CM

## 2020-01-06 DIAGNOSIS — R05.9 COUGH: ICD-10-CM

## 2020-01-06 DIAGNOSIS — L03.115 CELLULITIS OF RIGHT LOWER EXTREMITY: Primary | ICD-10-CM

## 2020-01-06 PROCEDURE — 99282 EMERGENCY DEPT VISIT SF MDM: CPT

## 2020-01-06 PROCEDURE — 71046 X-RAY EXAM CHEST 2 VIEWS: CPT

## 2020-01-06 PROCEDURE — 87081 CULTURE SCREEN ONLY: CPT

## 2020-01-06 RX ORDER — DOXYCYCLINE HYCLATE 100 MG
100 TABLET ORAL 2 TIMES DAILY
Qty: 14 TAB | Refills: 0 | Status: SHIPPED | OUTPATIENT
Start: 2020-01-06 | End: 2020-01-13

## 2020-01-06 RX ORDER — HYDROCODONE POLISTIREX AND CHLORPHENIRAMINE POLISTIREX 10; 8 MG/5ML; MG/5ML
5 SUSPENSION, EXTENDED RELEASE ORAL
Qty: 15 ML | Refills: 0 | Status: SHIPPED | OUTPATIENT
Start: 2020-01-06 | End: 2020-01-09

## 2020-01-06 NOTE — DISCHARGE INSTRUCTIONS
Patient Education        Cellulitis: Care Instructions  Your Care Instructions    Cellulitis is a skin infection caused by bacteria, most often strep or staph. It often occurs after a break in the skin from a scrape, cut, bite, or puncture, or after a rash. Cellulitis may be treated without doing tests to find out what caused it. But your doctor may do tests, if needed, to look for a specific bacteria, like methicillin-resistant Staphylococcus aureus (MRSA). The doctor has checked you carefully, but problems can develop later. If you notice any problems or new symptoms, get medical treatment right away. Follow-up care is a key part of your treatment and safety. Be sure to make and go to all appointments, and call your doctor if you are having problems. It's also a good idea to know your test results and keep a list of the medicines you take. How can you care for yourself at home? · Take your antibiotics as directed. Do not stop taking them just because you feel better. You need to take the full course of antibiotics. · Prop up the infected area on pillows to reduce pain and swelling. Try to keep the area above the level of your heart as often as you can. · If your doctor told you how to care for your wound, follow your doctor's instructions. If you did not get instructions, follow this general advice:  ? Wash the wound with clean water 2 times a day. Don't use hydrogen peroxide or alcohol, which can slow healing. ? You may cover the wound with a thin layer of petroleum jelly, such as Vaseline, and a nonstick bandage. ? Apply more petroleum jelly and replace the bandage as needed. · Be safe with medicines. Take pain medicines exactly as directed. ? If the doctor gave you a prescription medicine for pain, take it as prescribed. ? If you are not taking a prescription pain medicine, ask your doctor if you can take an over-the-counter medicine.   To prevent cellulitis in the future  · Try to prevent cuts, scrapes, or other injuries to your skin. Cellulitis most often occurs where there is a break in the skin. · If you get a scrape, cut, mild burn, or bite, wash the wound with clean water as soon as you can to help avoid infection. Don't use hydrogen peroxide or alcohol, which can slow healing. · If you have swelling in your legs (edema), support stockings and good skin care may help prevent leg sores and cellulitis. · Take care of your feet, especially if you have diabetes or other conditions that increase the risk of infection. Wear shoes and socks. Do not go barefoot. If you have athlete's foot or other skin problems on your feet, talk to your doctor about how to treat them. When should you call for help? Call your doctor now or seek immediate medical care if:    · You have signs that your infection is getting worse, such as:  ? Increased pain, swelling, warmth, or redness. ? Red streaks leading from the area. ? Pus draining from the area. ? A fever.     · You get a rash.    Watch closely for changes in your health, and be sure to contact your doctor if:    · You do not get better as expected. Where can you learn more? Go to http://momo-yani.info/. Tori Guallpa in the search box to learn more about \"Cellulitis: Care Instructions. \"  Current as of: April 1, 2019  Content Version: 12.2  © 7078-7475 [employer]. Care instructions adapted under license by [employer] (which disclaims liability or warranty for this information). If you have questions about a medical condition or this instruction, always ask your healthcare professional. [place] [address] any warranty or liability for your use of this information. Patient Education        Cough: Care Instructions  Your Care Instructions    A cough is your body's response to something that bothers your throat or airways. Many things can cause a cough.  You might cough because of a cold or the flu, bronchitis, or asthma. Smoking, postnasal drip, allergies, and stomach acid that backs up into your throat also can cause coughs. A cough is a symptom, not a disease. Most coughs stop when the cause, such as a cold, goes away. You can take a few steps at home to cough less and feel better. Follow-up care is a key part of your treatment and safety. Be sure to make and go to all appointments, and call your doctor if you are having problems. It's also a good idea to know your test results and keep a list of the medicines you take. How can you care for yourself at home? · Drink lots of water and other fluids. This helps thin the mucus and soothes a dry or sore throat. Honey or lemon juice in hot water or tea may ease a dry cough. · Take cough medicine as directed by your doctor. · Prop up your head on pillows to help you breathe and ease a dry cough. · Try cough drops to soothe a dry or sore throat. Cough drops don't stop a cough. Medicine-flavored cough drops are no better than candy-flavored drops or hard candy. · Do not smoke. Avoid secondhand smoke. If you need help quitting, talk to your doctor about stop-smoking programs and medicines. These can increase your chances of quitting for good. When should you call for help? Call 911 anytime you think you may need emergency care. For example, call if:    · You have severe trouble breathing.    Call your doctor now or seek immediate medical care if:    · You cough up blood.     · You have new or worse trouble breathing.     · You have a new or higher fever.     · You have a new rash.    Watch closely for changes in your health, and be sure to contact your doctor if:    · You cough more deeply or more often, especially if you notice more mucus or a change in the color of your mucus.     · You have new symptoms, such as a sore throat, an earache, or sinus pain.     · You do not get better as expected. Where can you learn more?   Go to http://momo-yani.info/. Enter D279 in the search box to learn more about \"Cough: Care Instructions. \"  Current as of: June 9, 2019  Content Version: 12.2  © 8710-5040 [employer]. Care instructions adapted under license by [employer] (which disclaims liability or warranty for this information). If you have questions about a medical condition or this instruction, always ask your healthcare professional. [place] [address] any warranty or liability for your use of this information.

## 2020-01-06 NOTE — ED PROVIDER NOTES
EMERGENCY DEPARTMENT HISTORY AND PHYSICAL EXAM    Date: 1/6/2020  Patient Name: Manju Alvraes    History of Presenting Illness     Chief Complaint   Patient presents with    Sore Throat         History Provided By: Patient      Chief Complaint: Cough, sore throat and concern for returning cellulitis  Duration: Days  Timing: Gradual  Location: Cellulitis noted to right lower extremity  Quality: Itchy and sore  Severity: Moderate  Modifying Factors: Cellulitis is worse after scratching the area, cough and sore throat worse after being around his sick daughter  Associated Symptoms: none       Additional History (Context): Manju Alvares is a 40 y.o. male with a history of asthma, hypertension who presents today for history as listed above. Patient reports his daughter has been sick at home with similar symptoms. Patient denies body aches, chills, fever, nausea or vomiting. Patient states that he has a history of cellulitis to the right lower extremity, states that it was completely resolved however he is concerned it may be returning at this time. Patient has not been seen or evaluated for this. Patient reports cellulitis typically starts as a dry itchy patch of skin and then becomes infected after he scratches. PCP: Sang Zuñiga MD    Current Outpatient Medications   Medication Sig Dispense Refill    doxycycline (VIBRA-TABS) 100 mg tablet Take 1 Tab by mouth two (2) times a day for 7 days. 14 Tab 0    chlorpheniramine-HYDROcodone (TUSSIONEX PENNKINETIC ER) 10-8 mg/5 mL suspension Take 5 mL by mouth every twelve (12) hours as needed for Cough for up to 3 days. Max Daily Amount: 10 mL. 15 mL 0    amLODIPine (NORVASC) 10 mg tablet Take 1 Tab by mouth daily. 30 Tab 6    losartan (COZAAR) 100 mg tablet TAKE 1 TABLET BY MOUTH EVERY DAY 90 Tab 2    metoprolol succinate (TOPROL-XL) 50 mg XL tablet One tab Once daily 30 Tab 6    losartan (COZAAR) 100 mg tablet Take 1 Tab by mouth daily.  30 Tab 6    albuterol (PROAIR HFA) 90 mcg/actuation inhaler TAKE 2 PUFFS BY INHALATION EVERY SIX (6) HOURS AS NEEDED FOR WHEEZING. 1 Inhaler 2    Blood Pressure Monitor kit Take blood pressure as needed -be sure at least 2 hours after taking medications 1 Kit 0    atorvastatin (LIPITOR) 10 mg tablet Take 1 Tab by mouth daily. 30 Tab 3       Past History     Past Medical History:  Past Medical History:   Diagnosis Date    Asthma     Depression     EtOH dependence ([facility] [address]     Hypercholesterolemia     Hypertension     Non compliance w medication regimen     Pancreatitis, acute 4-2014       Past Surgical History:  Past Surgical History:   Procedure Laterality Date    HX HERNIA REPAIR         Family History:  Family History   Problem Relation Age of Onset    Hypertension Mother     Hypertension Father     Hypertension Maternal Grandmother     Hypertension Maternal Grandfather     Diabetes Maternal Grandfather     High Cholesterol Maternal Grandfather        Social History:  Social History     Tobacco Use    Smoking status: Former Smoker     Years: 1.00    Smokeless tobacco: Former User   Substance Use Topics    Alcohol use: No     Alcohol/week: 0.0 standard drinks    Drug use: Not on file       Allergies: Allergies   Allergen Reactions    Ace Inhibitors Cough    Theophylline Unknown (comments)         Review of Systems   Review of Systems   Constitutional: Negative for chills and fever. HENT: Positive for sore throat. Negative for congestion and rhinorrhea. Respiratory: Positive for cough. Negative for shortness of breath. Cardiovascular: Negative for chest pain. Gastrointestinal: Negative for abdominal pain, blood in stool, constipation, diarrhea, nausea and vomiting. Genitourinary: Negative for dysuria, frequency and hematuria. Musculoskeletal: Negative for back pain and myalgias. Skin: Positive for color change. Negative for rash and wound. Neurological: Negative for dizziness and headaches. All other systems reviewed and are negative. All Other Systems Negative  Physical Exam     Vitals:    20 1340 20 1429   BP: (!) 186/119    Pulse: (!) 111 89   Resp: 16    Temp: 99.1 °F (37.3 °C)    SpO2: 100%      Physical Exam  Vitals signs and nursing note reviewed. Constitutional:       General: He is not in acute distress. Appearance: He is well-developed. He is not diaphoretic. HENT:      Head: Normocephalic and atraumatic. Right Ear: Tympanic membrane and ear canal normal.      Left Ear: Tympanic membrane and ear canal normal.      Mouth/Throat:      Mouth: Mucous membranes are moist.      Pharynx: Posterior oropharyngeal erythema present. No oropharyngeal exudate. Tonsils: No tonsillar exudate. Swellin on the right. 0 on the left. Eyes:      Conjunctiva/sclera: Conjunctivae normal.   Neck:      Musculoskeletal: Normal range of motion and neck supple. Cardiovascular:      Rate and Rhythm: Normal rate and regular rhythm. Heart sounds: Normal heart sounds. Pulmonary:      Effort: Pulmonary effort is normal. No respiratory distress. Breath sounds: Normal breath sounds. No stridor, decreased air movement or transmitted upper airway sounds. No decreased breath sounds, wheezing, rhonchi or rales. Chest:      Chest wall: No tenderness. Abdominal:      General: Bowel sounds are normal. There is no distension. Palpations: Abdomen is soft. Tenderness: There is no tenderness. There is no guarding or rebound. Musculoskeletal: Normal range of motion. General: No deformity. Skin:     General: Skin is warm and dry. Neurological:      Mental Status: He is alert and oriented to person, place, and time.            Diagnostic Study Results     Labs -     Recent Results (from the past 12 hour(s))   STREP THROAT SCREEN    Collection Time: 20  1:41 PM   Result Value Ref Range    Special Requests: NO SPECIAL REQUESTS      Strep Screen NEGATIVE       Culture result: PENDING        Radiologic Studies -   XR CHEST PA LAT   Final Result   IMPRESSION: No acute radiographic cardiopulmonary abnormality. CT Results  (Last 48 hours)    None        CXR Results  (Last 48 hours)               01/06/20 1413  XR CHEST PA LAT Final result    Impression:  IMPRESSION: No acute radiographic cardiopulmonary abnormality. Narrative:  EXAM: XR CHEST PA LAT       CLINICAL INDICATION/HISTORY: Cough   -Additional: None       COMPARISON: Thoracic spine radiographs 11/18/2014       TECHNIQUE: PA and lateral views of the chest       _______________       FINDINGS:       HEART AND MEDIASTINUM: Cardiac size and mediastinal contours are within normal   limits       LUNGS AND PLEURAL SPACES: No focal pneumonic consolidation, pneumothorax or   pleural effusion       BONY THORAX AND SOFT TISSUES: No acute osseous abnormality       _______________                   Medical Decision Making   I am the first provider for this patient. I reviewed the vital signs, available nursing notes, past medical history, past surgical history, family history and social history. Vital Signs-Reviewed the patient's vital signs. Records Reviewed: Nursing Notes and Old Medical Records     Procedures: None   Procedures    Provider Notes (Medical Decision Making):     Differential Diagnosis:  influenza, mononucleosis, acute bronchitis, URI, streptococcal pharyngitis, pertussis, pneumonia, asthma exacerbation, allergic rhinitis, cellulitis, abscess       Plan: We will order throat screen and chest x-ray    2:34 PM  Have shared reassuring work-up with patient. Blood pressure likely elevated upon arrival due to just taking his blood pressure medicine before he checked in. Have discussed the importance of taking blood pressure medication on time. Will discharge home with course of doxycycline for mild lower extremity cellulitis. We will also give 3-day course of Tussionex.   Have stressed the importance of PCP follow-up for cellulitis to ensure this is resolving. Patient agrees with the plan and management and states all questions have been thoroughly answered and there are no more remaining questions. MED RECONCILIATION:  No current facility-administered medications for this encounter. Current Outpatient Medications   Medication Sig    doxycycline (VIBRA-TABS) 100 mg tablet Take 1 Tab by mouth two (2) times a day for 7 days.  chlorpheniramine-HYDROcodone (TUSSIONEX PENNKINETIC ER) 10-8 mg/5 mL suspension Take 5 mL by mouth every twelve (12) hours as needed for Cough for up to 3 days. Max Daily Amount: 10 mL.  amLODIPine (NORVASC) 10 mg tablet Take 1 Tab by mouth daily.  losartan (COZAAR) 100 mg tablet TAKE 1 TABLET BY MOUTH EVERY DAY    metoprolol succinate (TOPROL-XL) 50 mg XL tablet One tab Once daily    losartan (COZAAR) 100 mg tablet Take 1 Tab by mouth daily.  albuterol (PROAIR HFA) 90 mcg/actuation inhaler TAKE 2 PUFFS BY INHALATION EVERY SIX (6) HOURS AS NEEDED FOR WHEEZING.  Blood Pressure Monitor kit Take blood pressure as needed -be sure at least 2 hours after taking medications    atorvastatin (LIPITOR) 10 mg tablet Take 1 Tab by mouth daily. Disposition:  Home     DISCHARGE NOTE:   Pt has been reexamined. Patient has no new complaints, changes, or physical findings. Care plan outlined and precautions discussed. Results of workup were reviewed with the patient. All medications were reviewed with the patient. All of pt's questions and concerns were addressed. Patient was instructed and agrees to follow up with PCP as well as to return to the ED upon further deterioration. Patient is ready to go home.     Follow-up Information     Follow up With Specialties Details Why [address] [facility] EMERGENCY DEPT Emergency Medicine  As needed [address] [place] [address]    Aubree Madison MD Internal Medicine   [address] [facility]  [address] [facility]  [phone]            Current Discharge Medication List      START taking these medications    Details   doxycycline (VIBRA-TABS) 100 mg tablet Take 1 Tab by mouth two (2) times a day for 7 days. Qty: 14 Tab, Refills: 0         CONTINUE these medications which have CHANGED    Details   chlorpheniramine-HYDROcodone (TUSSIONEX PENNKINETIC ER) 10-8 mg/5 mL suspension Take 5 mL by mouth every twelve (12) hours as needed for Cough for up to 3 days. Max Daily Amount: 10 mL. Qty: 15 mL, Refills: 0    Associated Diagnoses: Cough                 Diagnosis     Clinical Impression:   1. Cellulitis of right lower extremity    2. Cough    3. Sorethroat          \"Please note that this dictation was completed with [employer], the computer voice recognition software. Quite often unanticipated grammatical, syntax, homophones, and other interpretive errors are inadvertently transcribed by the computer software. Please disregard these errors. Please excuse any errors that have escaped final proofreading. \"

## 2020-01-08 LAB
B-HEM STREP THROAT QL CULT: NEGATIVE
BACTERIA SPEC CULT: NORMAL
SERVICE CMNT-IMP: NORMAL

## 2020-05-28 DIAGNOSIS — I10 ESSENTIAL HYPERTENSION: ICD-10-CM

## 2020-05-29 RX ORDER — METOPROLOL SUCCINATE 50 MG/1
TABLET, EXTENDED RELEASE ORAL
Qty: 30 TAB | Refills: 6 | Status: SHIPPED | OUTPATIENT
Start: 2020-05-29 | End: 2020-06-01

## 2020-05-29 RX ORDER — AMLODIPINE BESYLATE 10 MG/1
10 TABLET ORAL DAILY
Qty: 30 TAB | Refills: 6 | Status: SHIPPED | OUTPATIENT
Start: 2020-05-29 | End: 2020-06-16

## 2020-06-01 DIAGNOSIS — I10 ESSENTIAL HYPERTENSION: ICD-10-CM

## 2020-06-01 RX ORDER — METOPROLOL SUCCINATE 50 MG/1
TABLET, EXTENDED RELEASE ORAL
Qty: 90 TAB | Refills: 0 | Status: SHIPPED | OUTPATIENT
Start: 2020-06-01 | End: 2020-09-04 | Stop reason: SDUPTHER

## 2020-06-16 RX ORDER — AMLODIPINE BESYLATE 10 MG/1
TABLET ORAL
Qty: 90 TAB | Refills: 1 | Status: SHIPPED | OUTPATIENT
Start: 2020-06-16 | End: 2020-09-04 | Stop reason: SDUPTHER

## 2020-09-04 DIAGNOSIS — I10 ESSENTIAL HYPERTENSION: ICD-10-CM

## 2020-09-08 RX ORDER — LOSARTAN POTASSIUM 100 MG/1
TABLET ORAL
Qty: 90 TAB | Refills: 2 | Status: SHIPPED | OUTPATIENT
Start: 2020-09-08 | End: 2021-02-19

## 2020-09-08 RX ORDER — METOPROLOL SUCCINATE 50 MG/1
TABLET, EXTENDED RELEASE ORAL
Qty: 30 TAB | Refills: 0 | Status: SHIPPED | OUTPATIENT
Start: 2020-09-08 | End: 2020-09-09

## 2020-09-08 RX ORDER — AMLODIPINE BESYLATE 10 MG/1
TABLET ORAL
Qty: 30 TAB | Refills: 0 | Status: SHIPPED | OUTPATIENT
Start: 2020-09-08 | End: 2020-10-20 | Stop reason: SDUPTHER

## 2020-09-08 RX ORDER — LOSARTAN POTASSIUM 100 MG/1
100 TABLET ORAL DAILY
Qty: 30 TAB | Refills: 0 | Status: SHIPPED | OUTPATIENT
Start: 2020-09-08 | End: 2020-12-15 | Stop reason: SDUPTHER

## 2020-09-09 DIAGNOSIS — I10 ESSENTIAL HYPERTENSION: ICD-10-CM

## 2020-09-09 RX ORDER — METOPROLOL SUCCINATE 50 MG/1
TABLET, EXTENDED RELEASE ORAL
Qty: 90 TAB | Refills: 0 | Status: SHIPPED | OUTPATIENT
Start: 2020-09-09 | End: 2020-10-20 | Stop reason: SDUPTHER

## 2020-10-20 DIAGNOSIS — I10 ESSENTIAL HYPERTENSION: ICD-10-CM

## 2020-10-20 NOTE — TELEPHONE ENCOUNTER
PCP: Joanne Perales MD    Last appt: Visit date not found  No future appointments. Requested Prescriptions     Pending Prescriptions Disp Refills    amLODIPine (NORVASC) 10 mg tablet 30 Tab 0     Sig: TAKE 1 TABLET BY MOUTH EVERY DAY. Overdue for appt.  metoprolol succinate (TOPROL-XL) 50 mg XL tablet 30 Tab 0     Sig: TAKE 1 TABLET BY MOUTH EVERY DAY       Request for a 30 or 90 day supply? Provider Discretion - 30 day  Pharmacy: [facility]    Other Comments:   printed and placed in PCP medication refill review folder. Last UDS date:   Pain contract signed:     Patient notified that follow up appointment needs to be scheduled.

## 2020-10-21 RX ORDER — AMLODIPINE BESYLATE 10 MG/1
TABLET ORAL
Qty: 30 TAB | Refills: 0 | Status: SHIPPED | OUTPATIENT
Start: 2020-10-21 | End: 2020-12-30 | Stop reason: SDUPTHER

## 2020-10-21 RX ORDER — METOPROLOL SUCCINATE 50 MG/1
TABLET, EXTENDED RELEASE ORAL
Qty: 30 TAB | Refills: 0 | Status: SHIPPED | OUTPATIENT
Start: 2020-10-21 | End: 2020-12-30 | Stop reason: SDUPTHER

## 2020-12-16 RX ORDER — LOSARTAN POTASSIUM 100 MG/1
100 TABLET ORAL DAILY
Qty: 30 TAB | Refills: 0 | Status: SHIPPED | OUTPATIENT
Start: 2020-12-16 | End: 2021-01-13 | Stop reason: SDUPTHER

## 2020-12-30 DIAGNOSIS — I10 ESSENTIAL HYPERTENSION: ICD-10-CM

## 2020-12-30 RX ORDER — AMLODIPINE BESYLATE 10 MG/1
TABLET ORAL
Qty: 30 TAB | Refills: 0 | Status: SHIPPED | OUTPATIENT
Start: 2020-12-30 | End: 2021-01-13 | Stop reason: SDUPTHER

## 2020-12-30 RX ORDER — METOPROLOL SUCCINATE 50 MG/1
TABLET, EXTENDED RELEASE ORAL
Qty: 30 TAB | Refills: 0 | Status: SHIPPED | OUTPATIENT
Start: 2020-12-30 | End: 2021-01-13 | Stop reason: SDUPTHER

## 2020-12-30 NOTE — TELEPHONE ENCOUNTER
PCP: Jorje Ford MD    Last appt: 11/07/2019  Future Appointments   Date Time Provider Prashant Dumont   1/13/2021  1:00 PM Ronal Llamas MD [facility] BS AMB       Requested Prescriptions     Pending Prescriptions Disp Refills    amLODIPine (NORVASC) 10 mg tablet 30 Tab 0     Sig: TAKE 1 TABLET BY MOUTH EVERY DAY. Overdue for appt.  metoprolol succinate (TOPROL-XL) 50 mg XL tablet 30 Tab 0     Sig: TAKE 1 TABLET BY MOUTH EVERY DAY       Request for a 30 or 90 day supply? Provider Discretion    Pharmacy: Confirmed    Other Comments:  Medication refill request via phone.

## 2021-01-13 ENCOUNTER — OFFICE VISIT (OUTPATIENT)
Dept: CARDIOLOGY CLINIC | Age: 46
End: 2021-01-13
Payer: COMMERCIAL

## 2021-01-13 VITALS
TEMPERATURE: 98.8 F | BODY MASS INDEX: 25.98 KG/M2 | HEART RATE: 107 BPM | HEIGHT: 63 IN | WEIGHT: 146.6 LBS | DIASTOLIC BLOOD PRESSURE: 90 MMHG | RESPIRATION RATE: 16 BRPM | OXYGEN SATURATION: 98 % | SYSTOLIC BLOOD PRESSURE: 146 MMHG

## 2021-01-13 DIAGNOSIS — I11.9 HYPERTENSIVE HEART DISEASE WITHOUT HEART FAILURE: Primary | ICD-10-CM

## 2021-01-13 DIAGNOSIS — I10 HYPERTENSION, UNSPECIFIED TYPE: ICD-10-CM

## 2021-01-13 DIAGNOSIS — I10 ESSENTIAL HYPERTENSION: ICD-10-CM

## 2021-01-13 PROCEDURE — 99214 OFFICE O/P EST MOD 30 MIN: CPT | Performed by: INTERNAL MEDICINE

## 2021-01-13 RX ORDER — AMLODIPINE BESYLATE 10 MG/1
TABLET ORAL
Qty: 90 TAB | Refills: 3 | Status: SHIPPED | OUTPATIENT
Start: 2021-01-13 | End: 2021-12-22 | Stop reason: SDUPTHER

## 2021-01-13 RX ORDER — METOPROLOL SUCCINATE 50 MG/1
TABLET, EXTENDED RELEASE ORAL
Qty: 90 TAB | Refills: 3 | Status: SHIPPED | OUTPATIENT
Start: 2021-01-13 | End: 2021-12-22 | Stop reason: SDUPTHER

## 2021-01-13 RX ORDER — ATORVASTATIN CALCIUM 10 MG/1
10 TABLET, FILM COATED ORAL DAILY
Qty: 90 TAB | Refills: 3 | Status: SHIPPED | OUTPATIENT
Start: 2021-01-13 | End: 2021-12-22 | Stop reason: SDUPTHER

## 2021-01-13 NOTE — PROGRESS NOTES
[facility]    Mr. Keshawn Caballero is a 39 y.o. male with a history of hypertension, alcohol dependence, anxiety disorder. Patient is here today for follow-up appointment. He denies any symptoms to suggest angina or heart failure. He denies any palpitation, presyncope or syncope. He is taking his medications regularly. His blood pressure usually runs 643476 systolic and 91O04F diastolic. He does not perform regular exercise however he is very active at work place and move around as much as he can. He tries to avoid excessive salt intake. Denies any nausea, vomiting, abdominal pain, fever, chills, sputum production. No hematuria or other bleeding complaints    Past Medical History:   Diagnosis Date    Asthma     Depression     [facility] dependence ([facility] [address]     Hypercholesterolemia     Hypertension     Non compliance w medication regimen     Pancreatitis, acute 4-2014         Past Surgical History:   Procedure Laterality Date    HX HERNIA REPAIR         Current Outpatient Medications   Medication Sig    amLODIPine (NORVASC) 10 mg tablet TAKE 1 TABLET BY MOUTH EVERY DAY. Overdue for appt.  metoprolol succinate (TOPROL-XL) 50 mg XL tablet TAKE 1 TABLET BY MOUTH EVERY DAY    losartan (COZAAR) 100 mg tablet TAKE 1 TABLET BY MOUTH EVERY DAY    albuterol (PROAIR HFA) 90 mcg/actuation inhaler TAKE 2 PUFFS BY INHALATION EVERY SIX (6) HOURS AS NEEDED FOR WHEEZING.  Blood Pressure Monitor kit Take blood pressure as needed -be sure at least 2 hours after taking medications    atorvastatin (LIPITOR) 10 mg tablet Take 1 Tab by mouth daily. No current facility-administered medications for this visit.         Allergies and Sensitivities:  Allergies   Allergen Reactions    Ace Inhibitors Cough    Theophylline Unknown (comments)       Family History:  Family History   Problem Relation Age of Onset    Hypertension Mother     Hypertension Father     Hypertension Maternal Grandmother     Hypertension Maternal Grandfather     Diabetes Maternal Grandfather     High Cholesterol Maternal Grandfather        Social History:  Social History     Tobacco Use    Smoking status: Former Smoker     Years: 1.00    Smokeless tobacco: Former User   Substance Use Topics    Alcohol use: No     Alcohol/week: 0.0 standard drinks    Drug use: Not on file     He  reports that he has quit smoking. He quit after 1.00 year of use. He has quit using smokeless tobacco.  He  reports no history of alcohol use. Review of Systems:  Cardiac symptoms as noted above in HPI. All others negative. Physical Exam:  BP Readings from Last 3 Encounters:   01/13/21 (!) 146/90   01/06/20 (!) 186/119   11/21/19 144/87         Pulse Readings from Last 3 Encounters:   01/13/21 (!) 107   01/06/20 89   11/21/19 84          Wt Readings from Last 3 Encounters:   01/13/21 146 lb 9.6 oz (66.5 kg)   11/07/19 143 lb (64.9 kg)   02/07/19 144 lb (65.3 kg)       Constitutional: Oriented to person, place, and time. HENT: Head: Normocephalic and atraumatic. Neck: No JVD present. Carotid bruit is not appreciated. Cardiovascular: Regular rhythm. No murmur, gallop or rubs appreciated  Lung: Breath sounds normal. No respiratory distress. No ronchi or rales appreciated  Abdominal: No tenderness. No rebound and no guarding. Musculoskeletal: There is no lower extremity edema. No cynosis    Review of Data  LABS:   Lab Results   Component Value Date/Time    Sodium 133 (L) 11/07/2018 02:03 PM    Potassium 4.0 11/07/2018 02:03 PM    Chloride 98 (L) 11/07/2018 02:03 PM    CO2 26 11/07/2018 02:03 PM    Glucose 111 (H) 11/07/2018 02:03 PM    BUN 7 11/07/2018 02:03 PM    Creatinine 0.93 11/07/2018 02:03 PM     Lipids Latest Ref Rng & Units 11/7/2018 4/7/2017   Chol, Total <200 MG/(H) 222(H)   HDL 40 - 60 MG/(H) 72(H)   LDL 0 - 100 MG/DL 74.6 121. 2(H)   Trig <150 MG/DL 77 144   Chol/HDL Ratio 0 - 5.0   1.8 3.1   Some recent data might be hidden     Lab Results   Component Value Date/Time    ALT (SGPT) 25 11/07/2018 02:03 PM     Lab Results   Component Value Date/Time    Hemoglobin A1c 5.4 11/07/2018 02:03 PM       EKG (05/18) Sinus rhythm. Normal NC interval. NO ST changes of ischemia    ECHO    IMPRESSION & PLAN:  Mr. Halley Goff is a 39 y.o. male with a history of hypertension, alcohol dependence and anxiety disorder. Hypertension:   Mr. Halley Goff has a longstanding history of hypertension. Currently he is on 3 hypertensive medication including Toprol, losartan and amlodipine. Will order echocardiogram again to rule out hypertensive cardia vascular heart disease because of resistant hypertension. Importance of salt restriction, regular exercise and also dietary potassium intake was discussed again today  He did have a renal doppler done in 2017, which did not show any renal artery stenosis. Alcohol dependence:  Mr. Halley Goff has reduced drinking. An echocardiogram will be ordered to rule out alcohol related cardiomyopathy. He is now drinking 1 or 2 beers a day    Importance of diet and exercise was discussed with patient. This plan was discussed with patient who is in agreement. Thank you for allowing me to participate in patient care. Please feel free to call me if you have any question or concern. Quinton Smith MD  Please note: This document has been produced using voice recognition software. Unrecognized errors in transcription may be present.

## 2021-01-13 NOTE — PROGRESS NOTES
Fco Orozco presents today for   Chief Complaint   Patient presents with    Follow-up     [address] preferred language for health care discussion is english/other. Personal Protective Equipment:   Personal Protective Equipment was used including: mask-surgical and hands-gloves. Patient was placed on no precaution(s). Patient was masked. Is someone accompanying this pt? No    Is the patient using any DME equipment during OV? No    Depression Screening:  3 most recent PHQ Screens 11/7/2018   Little interest or pleasure in doing things Not at all   Feeling down, depressed, irritable, or hopeless Not at all   Total Score PHQ 2 0       Learning Assessment:  Learning Assessment 5/7/2015   PRIMARY LEARNER Patient   HIGHEST LEVEL OF EDUCATION - PRIMARY LEARNER  2 YEARS OF COLLEGE   BARRIERS PRIMARY LEARNER -   PRIMARY LANGUAGE ENGLISH   LEARNER PREFERENCE PRIMARY READING     -   ANSWERED BY Patient   RELATIONSHIP SELF       Abuse Screening:  Abuse Screening Questionnaire 4/7/2017   Do you ever feel afraid of your partner? N   Are you in a relationship with someone who physically or mentally threatens you? N   Is it safe for you to go home? Y       Fall Risk  No flowsheet data found. Pt currently taking Anticoagulant therapy? No    Coordination of Care:  1. Have you been to the ER, urgent care clinic since your last visit? Hospitalized since your last visit? No    2. Have you seen or consulted any other health care providers outside of the [address] [place] [address] since your last visit? Include any pap smears or colon screening.  No

## 2021-01-13 NOTE — PATIENT INSTRUCTIONS
Testing Echo Please call [facility] scheduling at [phone]  to schedule an appointment. All testing is completed at [address] [facility] **call office 3-5 days after testing is completed for results**

## 2021-02-03 ENCOUNTER — HOSPITAL ENCOUNTER (OUTPATIENT)
Dept: NON INVASIVE DIAGNOSTICS | Age: 46
Discharge: HOME | End: 2021-02-03
Attending: INTERNAL MEDICINE
Payer: COMMERCIAL

## 2021-02-03 VITALS
SYSTOLIC BLOOD PRESSURE: 146 MMHG | DIASTOLIC BLOOD PRESSURE: 90 MMHG | BODY MASS INDEX: 25.87 KG/M2 | WEIGHT: 146 LBS | HEIGHT: 63 IN

## 2021-02-03 DIAGNOSIS — I11.9 HYPERTENSIVE HEART DISEASE WITHOUT HEART FAILURE: ICD-10-CM

## 2021-02-03 PROCEDURE — 93306 TTE W/DOPPLER COMPLETE: CPT

## 2021-02-04 LAB
ECHO AO ARCH DIAM: 2.52 CM
ECHO AO ASC DIAM: 3.18 CM
ECHO AO ROOT DIAM: 3.5 CM
ECHO IVC PROX: 1.1 CM
ECHO IVC SNIFF: 1.1 CM
ECHO LA AREA 2C: 16.49 CM2
ECHO LA AREA 4C: 16.4 CM2
ECHO LA MAJOR AXIS: 3.89 CM
ECHO LA MINOR AXIS: 2.3 CM
ECHO LA TO AORTIC ROOT RATIO: 1.11
ECHO LA VOL 2C: 45.15 ML (ref 18–58)
ECHO LA VOL 4C: 44.7 ML (ref 18–58)
ECHO LA VOL BP: 49.49 ML (ref 18–58)
ECHO LA VOL/BSA BIPLANE: 29.25 ML/M2 (ref 16–28)
ECHO LA VOLUME INDEX A2C: 26.69 ML/M2 (ref 16–28)
ECHO LA VOLUME INDEX A4C: 26.42 ML/M2 (ref 16–28)
ECHO LV E' LATERAL VELOCITY: 12.36 CM/S
ECHO LV E' SEPTAL VELOCITY: 8.77 CM/S
ECHO LV EDV A2C: 91.8 ML
ECHO LV EDV A4C: 105.2 ML
ECHO LV EDV BP: 98.2 ML (ref 67–155)
ECHO LV EDV INDEX A4C: 62.2 ML/M2
ECHO LV EDV INDEX BP: 58 ML/M2
ECHO LV EDV NDEX A2C: 54.3 ML/M2
ECHO LV EDV TEICHHOLZ: 0.44 ML
ECHO LV EJECTION FRACTION A2C: 61 %
ECHO LV EJECTION FRACTION A4C: 55 %
ECHO LV EJECTION FRACTION BIPLANE: 57.8 % (ref 55–100)
ECHO LV ESV A2C: 35.5 ML
ECHO LV ESV A4C: 47.9 ML
ECHO LV ESV BP: 41.4 ML (ref 22–58)
ECHO LV ESV INDEX A2C: 21 ML/M2
ECHO LV ESV INDEX A4C: 28.3 ML/M2
ECHO LV ESV INDEX BP: 24.5 ML/M2
ECHO LV ESV TEICHHOLZ: 0.19 ML
ECHO LV INTERNAL DIMENSION DIASTOLIC: 4.02 CM (ref 4.2–5.9)
ECHO LV INTERNAL DIMENSION SYSTOLIC: 2.85 CM
ECHO LV IVSD: 1.14 CM (ref 0.6–1)
ECHO LV MASS 2D: 135.4 G (ref 88–224)
ECHO LV MASS INDEX 2D: 80 G/M2 (ref 49–115)
ECHO LV POSTERIOR WALL DIASTOLIC: 0.94 CM (ref 0.6–1)
ECHO LVOT DIAM: 1.7 CM
ECHO LVOT PEAK GRADIENT: 3.33 MMHG
ECHO LVOT SV: 38 ML
ECHO LVOT SV: 38 ML
ECHO LVOT VTI: 16.81 CM
ECHO MV A VELOCITY: 93.56 CM/S
ECHO MV E DECELERATION TIME (DT): 145.5 MS
ECHO MV E VELOCITY: 68.49 CM/S
ECHO MV E/A RATIO: 0.73
ECHO MV E/E' LATERAL: 5.54
ECHO MV E/E' RATIO (AVERAGED): 6.68
ECHO MV E/E' SEPTAL: 7.81
ECHO RA MINOR AXIS: 3.88 CM
ECHO RV TAPSE: 2.05 CM (ref 1.5–2)
LVFS 2D: 29.28 %
LVOT MG: 1.85 MMHG
LVOT MV: 0.65 CM/S
LVSV (MOD BI): 33.1 ML
LVSV (MOD SINGLE 4C): 33.42 ML
LVSV (MOD SINGLE): 32.8 ML
LVSV (TEICH): 23.45 ML
MV DEC SLOPE: 4.71

## 2021-02-19 RX ORDER — LOSARTAN POTASSIUM 100 MG/1
TABLET ORAL
Qty: 30 TAB | Refills: 0 | Status: SHIPPED | OUTPATIENT
Start: 2021-02-19 | End: 2021-03-17

## 2021-02-22 ENCOUNTER — NURSE TRIAGE (OUTPATIENT)
Dept: OTHER | Facility: CLINIC | Age: 46
End: 2021-02-22
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
## 2021-03-17 RX ORDER — LOSARTAN POTASSIUM 100 MG/1
TABLET ORAL
Qty: 30 TAB | Refills: 0 | Status: SHIPPED | OUTPATIENT
Start: 2021-03-17 | End: 2021-04-15

## 2021-04-15 RX ORDER — LOSARTAN POTASSIUM 100 MG/1
TABLET ORAL
Qty: 30 TAB | Refills: 0 | Status: SHIPPED | OUTPATIENT
Start: 2021-04-15 | End: 2021-12-22 | Stop reason: SDUPTHER

## 2021-12-22 DIAGNOSIS — J45.20 MILD INTERMITTENT ASTHMA WITHOUT COMPLICATION: ICD-10-CM

## 2021-12-22 DIAGNOSIS — I10 ESSENTIAL HYPERTENSION: ICD-10-CM

## 2021-12-22 DIAGNOSIS — I10 HYPERTENSION, UNSPECIFIED TYPE: ICD-10-CM

## 2021-12-22 RX ORDER — LOSARTAN POTASSIUM 100 MG/1
TABLET ORAL
Qty: 30 TABLET | Refills: 0 | Status: SHIPPED | OUTPATIENT
Start: 2021-12-22 | End: 2022-01-17 | Stop reason: SDUPTHER

## 2021-12-22 RX ORDER — METOPROLOL SUCCINATE 50 MG/1
TABLET, EXTENDED RELEASE ORAL
Qty: 90 TABLET | Refills: 0 | Status: SHIPPED | OUTPATIENT
Start: 2021-12-22

## 2021-12-22 RX ORDER — ATORVASTATIN CALCIUM 10 MG/1
10 TABLET, FILM COATED ORAL DAILY
Qty: 90 TABLET | Refills: 0 | Status: SHIPPED | OUTPATIENT
Start: 2021-12-22

## 2021-12-22 RX ORDER — AMLODIPINE BESYLATE 10 MG/1
TABLET ORAL
Qty: 90 TABLET | Refills: 0 | Status: SHIPPED | OUTPATIENT
Start: 2021-12-22

## 2021-12-22 NOTE — TELEPHONE ENCOUNTER
PCP: Isabel Christie MD    Last appt: 1/13/2021  Future Appointments   Date Time Provider Prashant Dumont   2/3/2022  3:00 PM Lalito Azul MD [facility] BS AMB       Requested Prescriptions     Pending Prescriptions Disp Refills    amLODIPine (NORVASC) 10 mg tablet 90 Tablet 0     Sig: TAKE 1 TABLET BY MOUTH EVERY DAY.  atorvastatin (LIPITOR) 10 mg tablet 90 Tablet 0     Sig: Take 1 Tablet by mouth daily.     losartan (COZAAR) 100 mg tablet 30 Tablet 0     Sig: TAKE 1 TABLET BY MOUTH ONE TIME A DAY    metoprolol succinate (TOPROL-XL) 50 mg XL tablet 90 Tablet 0     Sig: TAKE 1 TABLET BY MOUTH EVERY DAY

## 2021-12-22 NOTE — TELEPHONE ENCOUNTER
Requested Prescriptions     Pending Prescriptions Disp Refills    amLODIPine (NORVASC) 10 mg tablet 90 Tablet 3     Sig: TAKE 1 TABLET BY MOUTH EVERY DAY.  atorvastatin (LIPITOR) 10 mg tablet 90 Tablet 3     Sig: Take 1 Tablet by mouth daily.  losartan (COZAAR) 100 mg tablet 30 Tablet 0    metoprolol succinate (TOPROL-XL) 50 mg XL tablet 90 Tablet 3     Sig: TAKE 1 TABLET BY MOUTH EVERY DAY       Patient is currently out of medication.     Patient has f/u appt 2/3/21

## 2022-01-17 RX ORDER — LOSARTAN POTASSIUM 100 MG/1
TABLET ORAL
Qty: 30 TABLET | Refills: 0 | Status: SHIPPED | OUTPATIENT
Start: 2022-01-17

## 2022-01-17 NOTE — TELEPHONE ENCOUNTER
PCP: Adrian Wen MD    Last appt: 1/13/2021  Future Appointments   Date Time Provider Prashant Dumont   2/3/2022  3:00 PM Kane Molina MD [facility] BS AMB       Requested Prescriptions     Pending Prescriptions Disp Refills    losartan (COZAAR) 100 mg tablet 30 Tablet 0     Sig: TAKE 1 TABLET BY MOUTH ONE TIME A DAY